# Patient Record
Sex: MALE | Race: WHITE | URBAN - METROPOLITAN AREA
[De-identification: names, ages, dates, MRNs, and addresses within clinical notes are randomized per-mention and may not be internally consistent; named-entity substitution may affect disease eponyms.]

---

## 2017-02-23 ENCOUNTER — OUTPATIENT (OUTPATIENT)
Dept: OUTPATIENT SERVICES | Facility: HOSPITAL | Age: 62
LOS: 1 days | Discharge: HOME | End: 2017-02-23

## 2017-06-27 DIAGNOSIS — J02.9 ACUTE PHARYNGITIS, UNSPECIFIED: ICD-10-CM

## 2017-12-24 ENCOUNTER — EMERGENCY (EMERGENCY)
Facility: HOSPITAL | Age: 62
LOS: 0 days | Discharge: HOME | End: 2017-12-24
Admitting: INTERNAL MEDICINE

## 2017-12-24 DIAGNOSIS — Z98.890 OTHER SPECIFIED POSTPROCEDURAL STATES: ICD-10-CM

## 2017-12-24 DIAGNOSIS — M86.10 OTHER ACUTE OSTEOMYELITIS, UNSPECIFIED SITE: ICD-10-CM

## 2017-12-24 DIAGNOSIS — Z79.899 OTHER LONG TERM (CURRENT) DRUG THERAPY: ICD-10-CM

## 2017-12-24 DIAGNOSIS — R50.9 FEVER, UNSPECIFIED: ICD-10-CM

## 2017-12-24 DIAGNOSIS — Z87.891 PERSONAL HISTORY OF NICOTINE DEPENDENCE: ICD-10-CM

## 2017-12-24 DIAGNOSIS — I25.10 ATHEROSCLEROTIC HEART DISEASE OF NATIVE CORONARY ARTERY WITHOUT ANGINA PECTORIS: ICD-10-CM

## 2017-12-24 DIAGNOSIS — R42 DIZZINESS AND GIDDINESS: ICD-10-CM

## 2017-12-24 DIAGNOSIS — I10 ESSENTIAL (PRIMARY) HYPERTENSION: ICD-10-CM

## 2017-12-24 DIAGNOSIS — J18.9 PNEUMONIA, UNSPECIFIED ORGANISM: ICD-10-CM

## 2017-12-24 DIAGNOSIS — F41.0 PANIC DISORDER [EPISODIC PAROXYSMAL ANXIETY]: ICD-10-CM

## 2017-12-24 DIAGNOSIS — Z79.02 LONG TERM (CURRENT) USE OF ANTITHROMBOTICS/ANTIPLATELETS: ICD-10-CM

## 2017-12-24 DIAGNOSIS — F32.9 MAJOR DEPRESSIVE DISORDER, SINGLE EPISODE, UNSPECIFIED: ICD-10-CM

## 2017-12-24 DIAGNOSIS — I25.2 OLD MYOCARDIAL INFARCTION: ICD-10-CM

## 2017-12-24 DIAGNOSIS — Z95.1 PRESENCE OF AORTOCORONARY BYPASS GRAFT: ICD-10-CM

## 2018-01-10 ENCOUNTER — TRANSCRIPTION ENCOUNTER (OUTPATIENT)
Age: 63
End: 2018-01-10

## 2018-04-23 ENCOUNTER — TRANSCRIPTION ENCOUNTER (OUTPATIENT)
Age: 63
End: 2018-04-23

## 2018-12-30 ENCOUNTER — TRANSCRIPTION ENCOUNTER (OUTPATIENT)
Age: 63
End: 2018-12-30

## 2019-02-01 ENCOUNTER — TRANSCRIPTION ENCOUNTER (OUTPATIENT)
Age: 64
End: 2019-02-01

## 2019-04-05 PROBLEM — Z00.00 ENCOUNTER FOR PREVENTIVE HEALTH EXAMINATION: Status: ACTIVE | Noted: 2019-04-05

## 2019-04-09 ENCOUNTER — APPOINTMENT (OUTPATIENT)
Dept: NEUROSURGERY | Facility: CLINIC | Age: 64
End: 2019-04-09
Payer: COMMERCIAL

## 2019-04-09 VITALS — BODY MASS INDEX: 29.25 KG/M2 | WEIGHT: 193 LBS | HEIGHT: 68 IN

## 2019-04-09 DIAGNOSIS — E78.5 HYPERLIPIDEMIA, UNSPECIFIED: ICD-10-CM

## 2019-04-09 DIAGNOSIS — I25.10 ATHEROSCLEROTIC HEART DISEASE OF NATIVE CORONARY ARTERY W/OUT ANGINA PECTORIS: ICD-10-CM

## 2019-04-09 DIAGNOSIS — Z87.891 PERSONAL HISTORY OF NICOTINE DEPENDENCE: ICD-10-CM

## 2019-04-09 DIAGNOSIS — F41.9 ANXIETY DISORDER, UNSPECIFIED: ICD-10-CM

## 2019-04-09 PROCEDURE — 99204 OFFICE O/P NEW MOD 45 MIN: CPT

## 2019-04-09 RX ORDER — DULOXETINE HYDROCHLORIDE 30 MG/1
CAPSULE, DELAYED RELEASE ORAL
Refills: 0 | Status: ACTIVE | COMMUNITY

## 2019-04-09 RX ORDER — CLOPIDOGREL 75 MG/1
TABLET, FILM COATED ORAL
Refills: 0 | Status: ACTIVE | COMMUNITY

## 2019-04-09 RX ORDER — SIMVASTATIN 40 MG/1
TABLET, FILM COATED ORAL
Refills: 0 | Status: ACTIVE | COMMUNITY

## 2019-04-09 RX ORDER — NIACIN 1000 MG/1
TABLET, FILM COATED, EXTENDED RELEASE ORAL
Refills: 0 | Status: ACTIVE | COMMUNITY

## 2019-04-09 NOTE — PHYSICAL EXAM
[General Appearance - Alert] : alert [General Appearance - In No Acute Distress] : in no acute distress [General Appearance - Well Nourished] : well nourished [General Appearance - Well-Appearing] : healthy appearing [General Appearance - Well Developed] : well developed [Intact] : all reflexes within normal limits bilaterally [FreeTextEntry6] : He has positive provocative maneuvers indicating an SI joint dysfunction.  [Straight-Leg Raise Test - Left] : straight leg raise of the left leg was negative [Straight-Leg Raise Test - Right] : straight leg raise  of the right leg was negative

## 2019-04-09 NOTE — REASON FOR VISIT
[Referred By: _________] : Patient was referred by FE [Consultation] : a consultation visit [Spouse] : spouse
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
## 2019-04-09 NOTE — ASSESSMENT
[FreeTextEntry1] : Mr. Melo is aware of his findings. We have had a thorough discussion today. I believe he is experiencing a component of SI joint dysfunction. I would like him to start PT for these issues. He does not wish to consult with pain management for SI joint blocks. He is not considering any surgical intervention due to his cardiac history. I will see him back in 8 weeks for reassessment.

## 2019-06-11 ENCOUNTER — APPOINTMENT (OUTPATIENT)
Dept: NEUROSURGERY | Facility: CLINIC | Age: 64
End: 2019-06-11
Payer: COMMERCIAL

## 2019-06-11 VITALS — HEIGHT: 68 IN | WEIGHT: 195 LBS | BODY MASS INDEX: 29.55 KG/M2

## 2019-06-11 DIAGNOSIS — R29.898 OTHER SYMPTOMS AND SIGNS INVOLVING THE MUSCULOSKELETAL SYSTEM: ICD-10-CM

## 2019-06-11 PROCEDURE — 99214 OFFICE O/P EST MOD 30 MIN: CPT

## 2019-06-11 NOTE — REASON FOR VISIT
[Follow-Up: _____] : a [unfilled] follow-up visit [Spouse] : spouse [FreeTextEntry1] : He continues to have lower back pain especially around the SI joint area. He did undergo an injection by Dr. Huitron which provided him with pain relief for a couple weeks. He denies radicular symptoms. His pain is isolated around the SI joints. He has recently developed bilateral hand pain worse on the right side then the left. He denies radiculopathy down the arms.
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
## 2019-06-11 NOTE — PHYSICAL EXAM
[FreeTextEntry1] : He has weakness in both hands in the intrinsic muscles with associated sensory changes especially in a C7 distribution. Tinel's is negative at the wrists and elbows. Gait is steady.

## 2019-06-11 NOTE — ASSESSMENT
[FreeTextEntry1] : I would like him to undergo an EMG of the upper extremities to rule out peripheral neuropathy. In addition I have recommended RFA with Dr. Huitron to his SI Joint. Mr. Melo is not a good surgical candidate for surgical intervention due to his extensive cardiac history.

## 2019-06-23 ENCOUNTER — EMERGENCY (EMERGENCY)
Facility: HOSPITAL | Age: 64
LOS: 0 days | Discharge: HOME | End: 2019-06-23
Admitting: EMERGENCY MEDICINE
Payer: MEDICARE

## 2019-06-23 VITALS
HEART RATE: 71 BPM | TEMPERATURE: 98 F | RESPIRATION RATE: 20 BRPM | DIASTOLIC BLOOD PRESSURE: 58 MMHG | OXYGEN SATURATION: 95 % | SYSTOLIC BLOOD PRESSURE: 120 MMHG

## 2019-06-23 DIAGNOSIS — Z87.891 PERSONAL HISTORY OF NICOTINE DEPENDENCE: ICD-10-CM

## 2019-06-23 DIAGNOSIS — Z88.2 ALLERGY STATUS TO SULFONAMIDES: ICD-10-CM

## 2019-06-23 DIAGNOSIS — M54.9 DORSALGIA, UNSPECIFIED: ICD-10-CM

## 2019-06-23 DIAGNOSIS — M54.41 LUMBAGO WITH SCIATICA, RIGHT SIDE: ICD-10-CM

## 2019-06-23 DIAGNOSIS — G89.29 OTHER CHRONIC PAIN: ICD-10-CM

## 2019-06-23 DIAGNOSIS — Z88.8 ALLERGY STATUS TO OTHER DRUGS, MEDICAMENTS AND BIOLOGICAL SUBSTANCES STATUS: ICD-10-CM

## 2019-06-23 PROCEDURE — 99283 EMERGENCY DEPT VISIT LOW MDM: CPT

## 2019-06-23 RX ORDER — DEXAMETHASONE 0.5 MG/5ML
10 ELIXIR ORAL ONCE
Refills: 0 | Status: COMPLETED | OUTPATIENT
Start: 2019-06-23 | End: 2019-06-23

## 2019-06-23 RX ORDER — KETOROLAC TROMETHAMINE 30 MG/ML
15 SYRINGE (ML) INJECTION ONCE
Refills: 0 | Status: DISCONTINUED | OUTPATIENT
Start: 2019-06-23 | End: 2019-06-23

## 2019-06-23 RX ADMIN — Medication 15 MILLIGRAM(S): at 18:43

## 2019-06-23 RX ADMIN — Medication 10 MILLIGRAM(S): at 18:44
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
## 2019-06-23 NOTE — ED PROVIDER NOTE - PHYSICAL EXAMINATION
Gen: NAD  Head: NCAT  HEENT: PERRL, oral mucosa moist, normal conjunctiva, oropharynx clear without exudate or erythema  Lung: CTAB, no respiratory distress, no wheezing, rales, rhonchi  CV: normal s1/s2, rrr, Normal perfusion, pulses 2+ throughout  MSK: No edema, no visible deformities, full range of motion in all 4 extremities. RLE: pedal pulse present. AROM. tenderness w/ flexion of hip.   Neuro: AOx3  Skin: No rash   Psych: normal affect

## 2019-06-23 NOTE — ED PROVIDER NOTE - NS ED ROS FT
Constitutional: (-) fever  Eyes/ENT: (-) blurry vision, (-) epistaxis  Cardiovascular: (-) chest pain, (-) syncope  Respiratory: (-) cough, (-) shortness of breath  Gastrointestinal: (-) vomiting, (-) diarrhea  Genitourinary: (-) dysuria, (-) hesitancy, (-) frequency   Musculoskeletal: (-) neck pain, (+) back pain, (-) joint pain  Integumentary: (-) rash, (-) edema  Neurological: (-) headache, (-) altered mental status  Allergic/Immunologic: (-) pruritus

## 2019-06-23 NOTE — ED ADULT NURSE NOTE - CAS ELECT INFOMATION PROVIDED
pt instructed to follow up with pmd in 1-2 days or return to ed for new or worsening symptoms/DC instructions

## 2019-06-23 NOTE — ED PROVIDER NOTE - CLINICAL SUMMARY MEDICAL DECISION MAKING FREE TEXT BOX
62 yo male with a hx of chronic back pain presents with radiating pain to the R lower back. will treat with decadron and Toradol. have f/u with his pain management physician (Dr. Huitron). 64 yo male with a hx of chronic back pain presents with radiating pain to the R lower back. will treat with decadron and Toradol. pt pain improved with treatment and is able to ambulate with no complications. have f/u with his pain management physician (Dr. Huitron).

## 2019-06-23 NOTE — ED PROVIDER NOTE - NSFOLLOWUPINSTRUCTIONS_ED_ALL_ED_FT
Please follow up with your primary care physician and pain management physician within 24-72 hours and return immediately if symptoms worsen.    Back Pain    WHAT YOU NEED TO KNOW:    Back pain is common. It can be caused by many conditions, such as arthritis or the breakdown of spinal discs. Your risk for back pain is increased by injuries, lack of activity, or repeated bending and twisting. You may feel sore or stiff on one or both sides of your back. The pain may spread to your buttocks or thighs.    DISCHARGE INSTRUCTIONS:    Return to the emergency department if:     You have pain, numbness, or weakness in one or both legs.      Your pain becomes so severe that you cannot walk.      You cannot control your urine or bowel movements.      You have severe back pain with chest pain.      You have severe back pain, nausea, and vomiting.      You have severe back pain that spreads to your side or genital area.    Contact your healthcare provider if:     You have back pain that does not get better with rest and pain medicine.      You have a fever.      You have pain that worsens when you are on your back or when you rest.      You have pain that worsens when you cough or sneeze.      You lose weight without trying.      You have questions or concerns about your condition or care.    Medicines:     NSAIDs help decrease swelling and pain. This medicine is available with or without a doctor's order. NSAIDs can cause stomach bleeding or kidney problems in certain people. If you take blood thinner medicine, always ask your healthcare provider if NSAIDs are safe for you. Always read the medicine label and follow directions.      Acetaminophen decreases pain and fever. It is available without a doctor's order. Ask how much to take and how often to take it. Follow directions. Read the labels of all other medicines you are using to see if they also contain acetaminophen, or ask your doctor or pharmacist. Acetaminophen can cause liver damage if not taken correctly. Do not use more than 4 grams (4,000 milligrams) total of acetaminophen in one day.       Muscle relaxers help decrease muscle spasms and back pain.      Prescription pain medicine may be given. Ask your healthcare provider how to take this medicine safely. Some prescription pain medicines contain acetaminophen. Do not take other medicines that contain acetaminophen without talking to your healthcare provider. Too much acetaminophen may cause liver damage. Prescription pain medicine may cause constipation. Ask your healthcare provider how to prevent or treat constipation.       Take your medicine as directed. Contact your healthcare provider if you think your medicine is not helping or if you have side effects. Tell him or her if you are allergic to any medicine. Keep a list of the medicines, vitamins, and herbs you take. Include the amounts, and when and why you take them. Bring the list or the pill bottles to follow-up visits. Carry your medicine list with you in case of an emergency.    How to manage your back pain:     Apply ice on your back for 15 to 20 minutes every hour or as directed. Use an ice pack, or put crushed ice in a plastic bag. Cover it with a towel before you apply it to your skin. Ice helps prevent tissue damage and decreases pain.      Apply heat on your back for 20 to 30 minutes every 2 hours for as many days as directed. Heat helps decrease pain and muscle spasms.      Stay active as much as you can without causing more pain. Bed rest could make your back pain worse. Avoid heavy lifting until your pain is gone.      Go to physical therapy as directed. A physical therapist can teach you exercises to help improve movement and strength, and to decrease pain.    Follow up with your healthcare provider in 2 weeks, or as directed: Write down your questions so you remember to ask them during your visits.       © Copyright [employer] 2019 All illustrations and images included in CareNotes are the copyrighted property of [email or web address] or [employer].

## 2019-06-26 ENCOUNTER — EMERGENCY (EMERGENCY)
Facility: HOSPITAL | Age: 64
LOS: 0 days | Discharge: HOME | End: 2019-06-26
Admitting: EMERGENCY MEDICINE
Payer: MEDICARE

## 2019-06-26 VITALS
TEMPERATURE: 97 F | RESPIRATION RATE: 18 BRPM | SYSTOLIC BLOOD PRESSURE: 164 MMHG | HEART RATE: 58 BPM | DIASTOLIC BLOOD PRESSURE: 73 MMHG | OXYGEN SATURATION: 98 %

## 2019-06-26 DIAGNOSIS — Z88.2 ALLERGY STATUS TO SULFONAMIDES: ICD-10-CM

## 2019-06-26 DIAGNOSIS — M54.41 LUMBAGO WITH SCIATICA, RIGHT SIDE: ICD-10-CM

## 2019-06-26 DIAGNOSIS — Z87.891 PERSONAL HISTORY OF NICOTINE DEPENDENCE: ICD-10-CM

## 2019-06-26 DIAGNOSIS — M54.9 DORSALGIA, UNSPECIFIED: ICD-10-CM

## 2019-06-26 DIAGNOSIS — Z88.8 ALLERGY STATUS TO OTHER DRUGS, MEDICAMENTS AND BIOLOGICAL SUBSTANCES STATUS: ICD-10-CM

## 2019-06-26 PROCEDURE — 99283 EMERGENCY DEPT VISIT LOW MDM: CPT

## 2019-06-26 RX ORDER — METHOCARBAMOL 500 MG/1
2 TABLET, FILM COATED ORAL
Qty: 18 | Refills: 0
Start: 2019-06-26 | End: 2019-06-28

## 2019-06-26 RX ORDER — METHOCARBAMOL 500 MG/1
1500 TABLET, FILM COATED ORAL ONCE
Refills: 0 | Status: COMPLETED | OUTPATIENT
Start: 2019-06-26 | End: 2019-06-26

## 2019-06-26 RX ORDER — DEXAMETHASONE 0.5 MG/5ML
10 ELIXIR ORAL ONCE
Refills: 0 | Status: COMPLETED | OUTPATIENT
Start: 2019-06-26 | End: 2019-06-26

## 2019-06-26 RX ORDER — KETOROLAC TROMETHAMINE 30 MG/ML
30 SYRINGE (ML) INJECTION ONCE
Refills: 0 | Status: DISCONTINUED | OUTPATIENT
Start: 2019-06-26 | End: 2019-06-26

## 2019-06-26 RX ADMIN — Medication 10 MILLIGRAM(S): at 13:45

## 2019-06-26 RX ADMIN — METHOCARBAMOL 1500 MILLIGRAM(S): 500 TABLET, FILM COATED ORAL at 13:44

## 2019-06-26 RX ADMIN — Medication 30 MILLIGRAM(S): at 13:47
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
## 2019-06-26 NOTE — ED PROVIDER NOTE - CARE PROVIDER_API CALL
Walt Huitron (MD)  Anesthesiology; Pain Medicine  [address] [place] [address]  Phone: [phone]  Fax: [phone]  Follow Up Time: 1-3 Days

## 2019-06-26 NOTE — ED PROVIDER NOTE - NS ED ROS FT
Review of Systems:  	•	CONSTITUTIONAL - no fever, no diaphoresis, no chills  	•	SKIN - no rash  	•	HEMATOLOGIC - no bleeding, no bruising  	•	EYES - no eye pain, no blurry vision  	•	ENT - no congestion  	•	RESPIRATORY - no shortness of breath, no cough  	•	CARDIAC - no chest pain, no palpitations  	•	GI - no abd pain, no nausea, no vomiting, no diarrhea, no constipation  	•	GENITO-URINARY - no dysuria; no hematuria, no increased urinary frequency  	•	MUSCULOSKELETAL - +back pain, no joint paint, no swelling, no redness  	•	NEUROLOGIC - no weakness, no headache, no paresthesias, no LOC  	All other ROS are negative except as documented in HPI.

## 2019-06-26 NOTE — ED PROVIDER NOTE - PHYSICAL EXAMINATION
VITAL SIGNS: I have reviewed nursing notes and confirm.  CONSTITUTIONAL: Well-developed; well-nourished; in no acute distress.  SKIN: Skin exam is warm and dry, no acute rash.  HEAD: Normocephalic; atraumatic.  EYES: PERRL, EOM intact; conjunctiva and sclera clear.  ENT: No nasal discharge; airway clear.   NECK: Supple; non tender.  CARD: S1, S2 normal; no murmurs, gallops, or rubs. Regular rate and rhythm.  RESP: Clear to auscultation bilaterally. No wheezes, rales or rhonchi.  ABD: Normal bowel sounds; soft; non-distended; non-tender.   EXT: Normal ROM. No edema. +Right sciatic notch tenderness. +Straight leg test.   NEURO: Alert, oriented. Grossly unremarkable. No focal deficits. No saddle anesthesia.   PSYCH: Cooperative, appropriate.

## 2019-07-02 ENCOUNTER — APPOINTMENT (OUTPATIENT)
Dept: UROLOGY | Facility: CLINIC | Age: 64
End: 2019-07-02
Payer: MEDICARE

## 2019-07-02 VITALS
DIASTOLIC BLOOD PRESSURE: 75 MMHG | HEART RATE: 60 BPM | BODY MASS INDEX: 29.55 KG/M2 | HEIGHT: 68 IN | WEIGHT: 195 LBS | SYSTOLIC BLOOD PRESSURE: 142 MMHG

## 2019-07-02 DIAGNOSIS — N40.0 BENIGN PROSTATIC HYPERPLASIA WITHOUT LOWER URINARY TRACT SYMPMS: ICD-10-CM

## 2019-07-02 PROCEDURE — 99204 OFFICE O/P NEW MOD 45 MIN: CPT

## 2019-08-06 ENCOUNTER — APPOINTMENT (OUTPATIENT)
Dept: NEUROSURGERY | Facility: CLINIC | Age: 64
End: 2019-08-06
Payer: COMMERCIAL

## 2019-08-06 VITALS — HEIGHT: 68 IN | WEIGHT: 195 LBS | BODY MASS INDEX: 29.55 KG/M2

## 2019-08-06 DIAGNOSIS — G56.03 CARPAL TUNNEL SYNDROM,BILATERAL UPPER LIMBS: ICD-10-CM

## 2019-08-06 DIAGNOSIS — M47.812 SPONDYLOSIS W/OUT MYELOPATHY OR RADICULOPATHY, CERVICAL REGION: ICD-10-CM

## 2019-08-06 PROCEDURE — 99214 OFFICE O/P EST MOD 30 MIN: CPT

## 2019-08-06 NOTE — ASSESSMENT
[FreeTextEntry1] : Mr. Melo continues to have right low back pain which occasionally travels to the right lateral thigh and calf. In addition his bilateral hand pain and tightness has worsened since I saw him last, along with some weakness. \par \par On exam, he has very positive Brendan test on the right and compression test. In addition he has positive Norma sign, His gait is somewhat slow and spastic. He has hyper-reflexia and positive Garcia signs as well. He has positive Tinel sign at both wrists, worse on the right. He also has some hand swelling, worse on the right. There is also weakness of both hand muscles, worse on the right. His right arm muscle movement appears to be slower and tighter although he does not complain of any arm pain or neck pain. \par \par EMG in June suggested mild bilateral carpal tunnel syndromes. MRI of the cervical spine early part of this year did not suggest spinal cord signal changes or significant compression at all although there is spondylotic changes. \par \par I would like him to start taking Vitamin B6 daily and wear the wrists braces to see if it would help his CTS. He will also ask his PMD to see if  he should see a rheumatologist regarding his hand swelling and joint pain.I would also like him to undergo a CT-guided right SI joint injection for confirmation of right sacroiliitis. Past injection and tests has been equivocal. Once the dx is confirmed, we will plan treatments. In additionhe will undergo MRI of brain to r/o intracranial process that may explains his spasticity and positive myelopathic signs.\par \par I will see him again for followup.

## 2019-08-13 ENCOUNTER — FORM ENCOUNTER (OUTPATIENT)
Age: 64
End: 2019-08-13

## 2019-08-14 ENCOUNTER — OUTPATIENT (OUTPATIENT)
Dept: OUTPATIENT SERVICES | Facility: HOSPITAL | Age: 64
LOS: 1 days | Discharge: HOME | End: 2019-08-14
Payer: COMMERCIAL

## 2019-08-14 ENCOUNTER — FORM ENCOUNTER (OUTPATIENT)
Age: 64
End: 2019-08-14

## 2019-08-14 PROCEDURE — 77012 CT SCAN FOR NEEDLE BIOPSY: CPT | Mod: 26

## 2019-08-14 PROCEDURE — 27096 INJECT SACROILIAC JOINT: CPT | Mod: RT

## 2019-08-14 PROCEDURE — 72192 CT PELVIS W/O DYE: CPT | Mod: 26

## 2019-08-14 NOTE — PROGRESS NOTE ADULT - SUBJECTIVE AND OBJECTIVE BOX
PREOPERATIVE DAY OF PROCEDURE EVALUATION:     I have personally seen and examined this patient. I agree with the history and physical which I have reviewed and noted any changes below:     Plan is for right sided CT guided sacroiliac joint injection today 8/14    Procedure/ risks/ benefits/ goals/ alternatives were explained. All questions answered. Informed content obtained from patient. Consent placed in chart.

## 2019-08-14 NOTE — PROGRESS NOTE ADULT - SUBJECTIVE AND OBJECTIVE BOX
Interventional Radiology Brief- Operative Note    Procedure: right sided CT guided sacroiliac joint injection    Pre-Op Diagnosis: sacroiliitis     Post-Op Diagnosis: same     Attending: Sergio  Performing  Provider: DAYANA Chakraborty    Anesthesia (type):  [ ] General Anesthesia  [ ] Sedation  [ ] Spinal Anesthesia  [ x ] Local/Regional    Contrast: none    Estimated Blood Loss: <5mL    Condition:   [ ] Critical  [ ] Serious  [ ] Fair   [ x ] Good    Findings/Follow up Plan of Care: right sacroiliac joint injection - see full report in PACS    Specimens Removed: none    Implants: none     Complications: none    Condition/Disposition: d/c home       Please call Interventional Radiology [phone] with any questions, concerns, or issues.

## 2019-08-15 ENCOUNTER — OUTPATIENT (OUTPATIENT)
Dept: OUTPATIENT SERVICES | Facility: HOSPITAL | Age: 64
LOS: 1 days | Discharge: HOME | End: 2019-08-15
Payer: MEDICARE

## 2019-08-15 DIAGNOSIS — R25.2 CRAMP AND SPASM: ICD-10-CM

## 2019-08-15 PROCEDURE — 70551 MRI BRAIN STEM W/O DYE: CPT | Mod: 26

## 2019-08-19 DIAGNOSIS — M46.1 SACROILIITIS, NOT ELSEWHERE CLASSIFIED: ICD-10-CM

## 2019-08-29 ENCOUNTER — APPOINTMENT (OUTPATIENT)
Dept: NEUROSURGERY | Facility: CLINIC | Age: 64
End: 2019-08-29
Payer: COMMERCIAL

## 2019-08-29 VITALS — HEIGHT: 68 IN | BODY MASS INDEX: 29.55 KG/M2 | WEIGHT: 195 LBS

## 2019-08-29 DIAGNOSIS — M46.1 SACROILIITIS, NOT ELSEWHERE CLASSIFIED: ICD-10-CM

## 2019-08-29 DIAGNOSIS — M47.816 SPONDYLOSIS W/OUT MYELOPATHY OR RADICULOPATHY, LUMBAR REGION: ICD-10-CM

## 2019-08-29 PROCEDURE — 99214 OFFICE O/P EST MOD 30 MIN: CPT

## 2019-08-29 NOTE — ASSESSMENT
[FreeTextEntry1] : Upon conclusion of today's visit Mr. Melo feel's his condition is manageable. He will continue with conservative treatments. I will see him in the office on an as needed basis.

## 2019-08-29 NOTE — HISTORY OF PRESENT ILLNESS
[FreeTextEntry1] : Since is last visit he had a CT guided SI joint injection on the right. Unfortunately, this did not provide him with pain relief. He also had an MRI of the brain which was normal.

## 2019-10-11 ENCOUNTER — INPATIENT (INPATIENT)
Facility: HOSPITAL | Age: 64
LOS: 0 days | Discharge: HOME | End: 2019-10-12
Attending: INTERNAL MEDICINE | Admitting: INTERNAL MEDICINE
Payer: MEDICARE

## 2019-10-11 VITALS
OXYGEN SATURATION: 98 % | HEART RATE: 60 BPM | HEIGHT: 68 IN | TEMPERATURE: 99 F | DIASTOLIC BLOOD PRESSURE: 79 MMHG | WEIGHT: 197.98 LBS | SYSTOLIC BLOOD PRESSURE: 174 MMHG | RESPIRATION RATE: 18 BRPM

## 2019-10-11 DIAGNOSIS — Z95.1 PRESENCE OF AORTOCORONARY BYPASS GRAFT: Chronic | ICD-10-CM

## 2019-10-11 DIAGNOSIS — Z90.49 ACQUIRED ABSENCE OF OTHER SPECIFIED PARTS OF DIGESTIVE TRACT: Chronic | ICD-10-CM

## 2019-10-11 LAB
ALBUMIN SERPL ELPH-MCNC: 4.5 G/DL — SIGNIFICANT CHANGE UP (ref 3.5–5.2)
ALP SERPL-CCNC: 113 U/L — SIGNIFICANT CHANGE UP (ref 30–115)
ALT FLD-CCNC: 14 U/L — SIGNIFICANT CHANGE UP (ref 0–41)
ANION GAP SERPL CALC-SCNC: 12 MMOL/L — SIGNIFICANT CHANGE UP (ref 7–14)
AST SERPL-CCNC: 19 U/L — SIGNIFICANT CHANGE UP (ref 0–41)
BILIRUB SERPL-MCNC: 0.3 MG/DL — SIGNIFICANT CHANGE UP (ref 0.2–1.2)
BUN SERPL-MCNC: 15 MG/DL — SIGNIFICANT CHANGE UP (ref 10–20)
CALCIUM SERPL-MCNC: 9.4 MG/DL — SIGNIFICANT CHANGE UP (ref 8.5–10.1)
CHLORIDE SERPL-SCNC: 105 MMOL/L — SIGNIFICANT CHANGE UP (ref 98–110)
CK MB CFR SERPL CALC: <1 NG/ML — SIGNIFICANT CHANGE UP (ref 0.6–6.3)
CK SERPL-CCNC: 46 U/L — SIGNIFICANT CHANGE UP (ref 0–225)
CO2 SERPL-SCNC: 25 MMOL/L — SIGNIFICANT CHANGE UP (ref 17–32)
CREAT SERPL-MCNC: 1 MG/DL — SIGNIFICANT CHANGE UP (ref 0.7–1.5)
GLUCOSE SERPL-MCNC: 105 MG/DL — HIGH (ref 70–99)
HCT VFR BLD CALC: 38.5 % — LOW (ref 42–52)
HGB BLD-MCNC: 12.7 G/DL — LOW (ref 14–18)
MCHC RBC-ENTMCNC: 29 PG — SIGNIFICANT CHANGE UP (ref 27–31)
MCHC RBC-ENTMCNC: 33 G/DL — SIGNIFICANT CHANGE UP (ref 32–37)
MCV RBC AUTO: 87.9 FL — SIGNIFICANT CHANGE UP (ref 80–94)
NRBC # BLD: 0 /100 WBCS — SIGNIFICANT CHANGE UP (ref 0–0)
PLATELET # BLD AUTO: 187 K/UL — SIGNIFICANT CHANGE UP (ref 130–400)
POTASSIUM SERPL-MCNC: 4.3 MMOL/L — SIGNIFICANT CHANGE UP (ref 3.5–5)
POTASSIUM SERPL-SCNC: 4.3 MMOL/L — SIGNIFICANT CHANGE UP (ref 3.5–5)
PROT SERPL-MCNC: 7.4 G/DL — SIGNIFICANT CHANGE UP (ref 6–8)
RBC # BLD: 4.38 M/UL — LOW (ref 4.7–6.1)
RBC # FLD: 13.1 % — SIGNIFICANT CHANGE UP (ref 11.5–14.5)
SODIUM SERPL-SCNC: 142 MMOL/L — SIGNIFICANT CHANGE UP (ref 135–146)
TROPONIN T SERPL-MCNC: <0.01 NG/ML — SIGNIFICANT CHANGE UP
TROPONIN T SERPL-MCNC: <0.01 NG/ML — SIGNIFICANT CHANGE UP
WBC # BLD: 5.08 K/UL — SIGNIFICANT CHANGE UP (ref 4.8–10.8)
WBC # FLD AUTO: 5.08 K/UL — SIGNIFICANT CHANGE UP (ref 4.8–10.8)

## 2019-10-11 PROCEDURE — 71046 X-RAY EXAM CHEST 2 VIEWS: CPT | Mod: 26

## 2019-10-11 PROCEDURE — 99285 EMERGENCY DEPT VISIT HI MDM: CPT

## 2019-10-11 PROCEDURE — 93010 ELECTROCARDIOGRAM REPORT: CPT

## 2019-10-11 RX ORDER — RANOLAZINE 500 MG/1
1 TABLET, FILM COATED, EXTENDED RELEASE ORAL
Qty: 0 | Refills: 0 | DISCHARGE

## 2019-10-11 RX ORDER — SIMVASTATIN 20 MG/1
1 TABLET, FILM COATED ORAL
Qty: 0 | Refills: 0 | DISCHARGE

## 2019-10-11 RX ORDER — SIMVASTATIN 20 MG/1
20 TABLET, FILM COATED ORAL AT BEDTIME
Refills: 0 | Status: DISCONTINUED | OUTPATIENT
Start: 2019-10-11 | End: 2019-10-12

## 2019-10-11 RX ORDER — RANOLAZINE 500 MG/1
500 TABLET, FILM COATED, EXTENDED RELEASE ORAL
Refills: 0 | Status: DISCONTINUED | OUTPATIENT
Start: 2019-10-11 | End: 2019-10-12

## 2019-10-11 RX ORDER — HYDROCHLOROTHIAZIDE 25 MG
12.5 TABLET ORAL DAILY
Refills: 0 | Status: DISCONTINUED | OUTPATIENT
Start: 2019-10-11 | End: 2019-10-12

## 2019-10-11 RX ORDER — DULOXETINE HYDROCHLORIDE 30 MG/1
60 CAPSULE, DELAYED RELEASE ORAL AT BEDTIME
Refills: 0 | Status: DISCONTINUED | OUTPATIENT
Start: 2019-10-11 | End: 2019-10-12

## 2019-10-11 RX ORDER — CLOPIDOGREL BISULFATE 75 MG/1
75 TABLET, FILM COATED ORAL DAILY
Refills: 0 | Status: DISCONTINUED | OUTPATIENT
Start: 2019-10-11 | End: 2019-10-12

## 2019-10-11 RX ORDER — CLOPIDOGREL BISULFATE 75 MG/1
1 TABLET, FILM COATED ORAL
Qty: 0 | Refills: 0 | DISCHARGE

## 2019-10-11 RX ORDER — NIACIN 50 MG
500 TABLET ORAL AT BEDTIME
Refills: 0 | Status: DISCONTINUED | OUTPATIENT
Start: 2019-10-11 | End: 2019-10-12

## 2019-10-11 RX ORDER — DIAZEPAM 5 MG
1 TABLET ORAL
Qty: 0 | Refills: 0 | DISCHARGE

## 2019-10-11 RX ORDER — ENOXAPARIN SODIUM 100 MG/ML
40 INJECTION SUBCUTANEOUS DAILY
Refills: 0 | Status: DISCONTINUED | OUTPATIENT
Start: 2019-10-11 | End: 2019-10-12

## 2019-10-11 RX ORDER — NIACIN 50 MG
1 TABLET ORAL
Qty: 0 | Refills: 0 | DISCHARGE

## 2019-10-11 RX ORDER — DIAZEPAM 5 MG
10 TABLET ORAL DAILY
Refills: 0 | Status: DISCONTINUED | OUTPATIENT
Start: 2019-10-11 | End: 2019-10-12

## 2019-10-11 RX ADMIN — Medication 12.5 MILLIGRAM(S): at 17:45

## 2019-10-11 RX ADMIN — SIMVASTATIN 20 MILLIGRAM(S): 20 TABLET, FILM COATED ORAL at 22:03

## 2019-10-11 RX ADMIN — RANOLAZINE 500 MILLIGRAM(S): 500 TABLET, FILM COATED, EXTENDED RELEASE ORAL at 17:45

## 2019-10-11 NOTE — ED PROVIDER NOTE - ATTENDING CONTRIBUTION TO CARE
64 y/o M pmh CAD s/p CABG, HTN, DLD, p/w pressure like L sided cp waking him from sleep this 1-130am. "just like" prior MI. Resolved after 15-20 min and taking ASA. No SOB, fever, cough, new leg swelling. ROS PE above.  IMP: cp/ acs.  P: labs, ekg, cxr, reassess. Despite time interval from sx, consider admission given risk factors and HPI.

## 2019-10-11 NOTE — H&P ADULT - NSHPLABSRESULTS_GEN_ALL_CORE
===================== LABS =====================                        12.7   5.08  )-----------( 187      ( 11 Oct 2019 14:30 )             38.5     10-11    142  |  105  |  15  ----------------------------<  105<H>  4.3   |  25  |  1.0    Ca    9.4      11 Oct 2019 14:30    TPro  7.4  /  Alb  4.5  /  TBili  0.3  /  DBili  x   /  AST  19  /  ALT  14  /  AlkPhos  113  10-11          Troponin T, Serum: <0.01 ng/mL (10-11-19 @ 14:30)    CARDIAC MARKERS ( 11 Oct 2019 14:30 )  x     / <0.01 ng/mL / x     / x     / x          ================= MICROBIOLOGY ================    ================== IMAGING ==================    ================== OTHER SIGNIFICANT FINDINGS ==================    ================== PROCEDURES ==================    ================== EKG ================== ===================== LABS =====================                        12.7   5.08  )-----------( 187      ( 11 Oct 2019 14:30 )             38.5     10-11    142  |  105  |  15  ----------------------------<  105<H>  4.3   |  25  |  1.0    Ca    9.4      11 Oct 2019 14:30    TPro  7.4  /  Alb  4.5  /  TBili  0.3  /  DBili  x   /  AST  19  /  ALT  14  /  AlkPhos  113  10-11    Troponin T, Serum: <0.01 ng/mL (10-11-19 @ 14:30)    CARDIAC MARKERS ( 11 Oct 2019 14:30 )  x     / <0.01 ng/mL / x     / x     / x        ================== EKG ==================    < from: 12 Lead ECG (10.11.19 @ 13:13) >    Diagnosis Line Sinus bradycardia  Left ventricular hypertrophy with repolarization abnormality  Abnormal ECG

## 2019-10-11 NOTE — ED ADULT NURSE NOTE - CHPI ED NUR SYMPTOMS NEG
no vomiting/no nausea/no congestion/no back pain/no fever/no dizziness/no chills/no diaphoresis/no shortness of breath/no syncope

## 2019-10-11 NOTE — H&P ADULT - NSHPPHYSICALEXAM_GEN_ALL_CORE
=================== OBJECTIVE ===================    VITAL SIGNS: Last 24 Hours  T(C): 36.4 (11 Oct 2019 15:30), Max: 37.1 (11 Oct 2019 12:59)  T(F): 97.6 (11 Oct 2019 15:30), Max: 98.7 (11 Oct 2019 12:59)  HR: 50 (11 Oct 2019 15:30) (50 - 60)  BP: 135/85 (11 Oct 2019 15:30) (135/85 - 174/79)  BP(mean): --  RR: 18 (11 Oct 2019 15:30) (18 - 18)  SpO2: 100% (11 Oct 2019 15:30) (98% - 100%)    PHYSICAL EXAM:  GENERAL: NAD, well-developed  HEAD:  Atraumatic, Normocephalic  EYES: EOMI, PERRLA, conjunctiva and sclera clear  NECK: Supple, No JVD  CHEST/LUNG: Clear to auscultation bilaterally; No wheeze  HEART: Regular rate and rhythm; No murmurs, rubs, or gallops  ABDOMEN: Soft, Nontender, Nondistended; Bowel sounds present  EXTREMITIES:  2+ Peripheral Pulses, No clubbing, cyanosis, or edema  PSYCH: AAOx3  NEUROLOGY: non-focal  General:  Appearance is consistent with chronologic age.  No abnormal facies.   General: AA&Ox3.  Fund of knowledge is intact and normal.  Language with normal repetition, comprehension and naming.  Nondysarthric.    Cranial nerves: intact VA, VFF.  EOMI w/o nystagmus, skew or reported double vision.  PERRL.  No ptosis/weakness of eyelid closure.  Facial sensation is normal with normal bite.  No facial asymmetry.  Hearing grossly intact b/l.  Palate elevates midline.  Tongue midline.  Motor examination:   Normal tone, bulk and range of motion. ++right hand tremor     Formal Muscle Strength Testing: (MRC grade R/L) 5/5 UE; 5/5 LE.  No observable drift. ++ rigidity on circular wrist maneuver and on circular elbow maneuver. - Tinel test    Reflexes:   2+ b/l biceps and brachioradialis.   Sensory examination:   Intact to light touch and pinprick, pain, temperature and proprioception and vibration in all extremities.  Cerebellum:   FTN intact with normal YANETH in all limbs.  No dysmetria or dysdiadokinesia.    Gait: ambulates with walker, ++ asymmetric steps, decreased heel strike   SKIN: No rashes or lesions =================== OBJECTIVE ===================    VITAL SIGNS: Last 24 Hours  T(C): 36.4 (11 Oct 2019 15:30), Max: 37.1 (11 Oct 2019 12:59)  T(F): 97.6 (11 Oct 2019 15:30), Max: 98.7 (11 Oct 2019 12:59)  HR: 50 (11 Oct 2019 15:30) (50 - 60)  BP: 135/85 (11 Oct 2019 15:30) (135/85 - 174/79)  BP(mean): --  RR: 18 (11 Oct 2019 15:30) (18 - 18)  SpO2: 100% (11 Oct 2019 15:30) (98% - 100%)    PHYSICAL EXAM:  GENERAL: NAD, well-developed  HEAD:  Atraumatic, Normocephalic  EYES: EOMI, PERRLA, conjunctiva and sclera clear  NECK: Supple, No JVD  CHEST/LUNG: Clear to auscultation bilaterally; No wheeze  HEART: Regular rate and rhythm; Bradycardic. No murmurs, rubs, or gallops  ABDOMEN: Soft, Nontender, Nondistended; Bowel sounds present in all 4 quadrants. No bruits   EXTREMITIES:  2+ Peripheral Pulses, No clubbing, cyanosis, or edema  PSYCH: AAOx3  NEUROLOGY: non-focal  SKIN: No rashes or lesions. Sternostomy scar and linear scars of bilateral LEs

## 2019-10-11 NOTE — ED ADULT NURSE NOTE - OBJECTIVE STATEMENT
The patient is a 63y Male complaining of chest pain that started last night that started while sitting down watching TV. Patient states she took 4 ASA and symptoms resolved. Patient denies any chest pain at this time. Patient denies any shortness of breath, cough, recent fever, chills, abdominal pain, nausea or vomiting. Patient is a poor historian and is unknown of all names and dosages of medications.

## 2019-10-11 NOTE — H&P ADULT - ASSESSMENT
================= ASSESSMENT/PLAN ==================  Patient is a 63y old Male who presents with a chief complaint of Currently admitted to medicine with the primary diagnosis of Chest pain        #  PLAN    #  PLAN    #  PLAN    #  PLAN    #  PLAN    #  PLAN  #    GI PPX:   DVT PPX:     DIET:  ACTIVITY:  () Ad Patience  /  () Advance as Tolerated  /  () Bed Rest  /  () Fall Precaution  /  () Seizure precaution    ================= PRESENT TODAY ==================    1-Penn Catheter:  Indication:  2-Vascular Access:  []Peripheral | Indication:  []Midline | Indication:   []PICC Line | Indication:  []CVC | Indication:  []Arterial Line | Indication:  []Uldall Catheter | Indication:   3-IV Fluids: | Indication:  4-Ventilation: | Sat:     ================= DISPOSITION ==================    Patient to be discharged when condition(s) optimized.            Discharge to:              Home services:              Counseled on/Discussed cessation of:  () Risky behaviors  /  () Smoking cessation  /  () Diet  /  () Exercise  /  () Other    ================= CODE STATUS =================                  () FULL CODE     |     () DNR     |     () DNI    () Discussion with patient and/or family regarding goals of care ================= ASSESSMENT/PLAN ==================  Patient is a 63y old Male who presents with a chief complaint of Currently admitted to medicine with the primary diagnosis of Chest pain    # Atypical chest pain   Currently asymptomatic. However patient with risk factors and similar to prior pain   1st cardiac enzymes negative, EKG with no changes   PLAN  - Serial cardiac enzymes   - Serial EKGs   - Telemetry for 24 hours   - Continue Plavix and statin for now   - Avoid beta blockers given bradycardia   - Discuss with Dr. Horner switching to higher potency statin     # Dyslipidemia   # Hypertension   - Continue HCTZ, niacin and statin     # Anxiety   - Continue Cymbalta and diazepam       GI PPX: Not indicated   DVT PPX: Lovenox     DIET: DASH   ACTIVITY:  () Ad Patience  /  (X) Advance as Tolerated  /  () Bed Rest  /  () Fall Precaution  /  () Seizure precaution    ================= PRESENT TODAY ==================    1-Penn Catheter: No  Indication:  2-Vascular Access:  [X]Peripheral | Indication:  []Midline | Indication:   []PICC Line | Indication:  []CVC | Indication:  []Arterial Line | Indication:  []Uldall Catheter | Indication:   3-IV Fluids: Off | Indication:  4-Ventilation: Room air | Sat:     ================= DISPOSITION ==================    Patient to be discharged when condition(s) optimized.            Discharge to: Home when cleared             Home services:              Counseled on/Discussed cessation of:  () Risky behaviors  /  () Smoking cessation  /  () Diet  /  () Exercise  /  () Other    ================= CODE STATUS =================                  (X) FULL CODE     |     () DNR     |     () DNI    () Discussion with patient and/or family regarding goals of care

## 2019-10-11 NOTE — ED ADULT TRIAGE NOTE - CHIEF COMPLAINT QUOTE
"im having spreading pains in my chest since last night , I took 4 asa and the pain went away but it felt like my last heart attack. today the pain subsided"

## 2019-10-11 NOTE — ED ADULT NURSE REASSESSMENT NOTE - NS ED NURSE REASSESS COMMENT FT1
patient assessed, no complaints of chest pain at this time. Labs drawn results pending. Patient placed on cardiac monitor, sinus bradycardia noted, MD aware. Patient asymptomatic ,vital signs stable, will monitor.

## 2019-10-11 NOTE — ED PROVIDER NOTE - OBJECTIVE STATEMENT
63y M w/ PMH of CAD s/p quadruple bypass, HTN, and HLD presents with chest pain that started last night. States sudden onset sharp pain radiating across b/l chest that occurred at rest. No alleviating/worsening factors. Went away after taking 4 baby ASA. States symptoms are similar to when he had his last heart attack. Currently asymptomatic. Denies fever, chills, SOB, n/v, abd pain, or numbness/tingling.    cardiologist: Dr. Pena.

## 2019-10-11 NOTE — H&P ADULT - NSHPREVIEWOFSYSTEMS_GEN_ALL_CORE
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
## 2019-10-12 ENCOUNTER — TRANSCRIPTION ENCOUNTER (OUTPATIENT)
Age: 64
End: 2019-10-12

## 2019-10-12 VITALS
DIASTOLIC BLOOD PRESSURE: 70 MMHG | HEART RATE: 54 BPM | SYSTOLIC BLOOD PRESSURE: 140 MMHG | RESPIRATION RATE: 18 BRPM | TEMPERATURE: 96 F

## 2019-10-12 LAB
CHOLEST SERPL-MCNC: 124 MG/DL — SIGNIFICANT CHANGE UP (ref 100–200)
CK MB CFR SERPL CALC: <1 NG/ML — SIGNIFICANT CHANGE UP (ref 0.6–6.3)
CK SERPL-CCNC: 42 U/L — SIGNIFICANT CHANGE UP (ref 0–225)
ESTIMATED AVERAGE GLUCOSE: 128 MG/DL — HIGH (ref 68–114)
HBA1C BLD-MCNC: 6.1 % — HIGH (ref 4–5.6)
HDLC SERPL-MCNC: 40 MG/DL — SIGNIFICANT CHANGE UP
LIPID PNL WITH DIRECT LDL SERPL: 79 MG/DL — SIGNIFICANT CHANGE UP (ref 4–129)
TOTAL CHOLESTEROL/HDL RATIO MEASUREMENT: 3.1 RATIO — LOW (ref 4–5.5)
TRIGL SERPL-MCNC: 98 MG/DL — SIGNIFICANT CHANGE UP (ref 10–149)
TROPONIN T SERPL-MCNC: <0.01 NG/ML — SIGNIFICANT CHANGE UP
TSH SERPL-MCNC: 2.85 UIU/ML — SIGNIFICANT CHANGE UP (ref 0.27–4.2)

## 2019-10-12 RX ADMIN — Medication 12.5 MILLIGRAM(S): at 05:42

## 2019-10-12 RX ADMIN — CLOPIDOGREL BISULFATE 75 MILLIGRAM(S): 75 TABLET, FILM COATED ORAL at 12:44

## 2019-10-12 RX ADMIN — RANOLAZINE 500 MILLIGRAM(S): 500 TABLET, FILM COATED, EXTENDED RELEASE ORAL at 05:42

## 2019-10-12 NOTE — DISCHARGE NOTE NURSING/CASE MANAGEMENT/SOCIAL WORK - PATIENT PORTAL LINK FT
You can access the FollowMyHealth Patient Portal offered by [facility] by registering at the following website: [email or web address]. By joining [facility]’s FollowMyHealth portal, you will also be able to view your health information using other applications (apps) compatible with our system.

## 2019-10-12 NOTE — DISCHARGE NOTE PROVIDER - NSDCCPCAREPLAN_GEN_ALL_CORE_FT
PRINCIPAL DISCHARGE DIAGNOSIS  Diagnosis: Chest pain  Assessment and Plan of Treatment: RESOLVED  - EKG no ischemic changes. cardiac enzymes negative X3  - seen by cardiology, Dr Costa.   - take all medication as prescribed  - follow up with primary care doctor, Dr Gregg and cardiology, Dr Fidel ren 1 week of discharge

## 2019-10-12 NOTE — PROGRESS NOTE ADULT - ASSESSMENT
62 y/o Male who presents with a chief complaint of atypical Chest pain    # Atypical chest pain   Currently asymptomatic. However patient with risk factors and similar to prior pain when he had MI  - EKG with no changes   - cardiac enzymes negative X3  - Telemetry for 24 hours   - Continue Plavix and statin for now   - Avoid beta blockers given bradycardia   - f/u cardiology consult with Dr Horner    # Dyslipidemia   # Hypertension   - Continue HCTZ, niacin and statin     # Anxiety   - Continue Cymbalta and diazepam     GI PPX: Not indicated   DVT PPX: Lovenox   ambulate as tolerated  DASH diet  dispo: will go home once cleared by cardio  FULL CODE

## 2019-10-12 NOTE — DISCHARGE NOTE PROVIDER - CARE PROVIDER_API CALL
Harry Gregg)  Hematology; Internal Medicine; Medical Oncology  [address] [place] [address]  Phone: [phone]  Fax: [phone]  Follow Up Time:     Tyler Aguirre)  Cardiovascular Disease  [address] [place] [address]  Phone: [phone]  Fax: [phone]  Follow Up Time:

## 2019-10-12 NOTE — CONSULT NOTE ADULT - ASSESSMENT
chest pain, one time, no ecg change normal chest xary and exam, possibly an esophageal spasm, dyspepsia vs musculoskeletal pain  as per patient stress nuclear test within a year has been ok  patient insists to go home    i had a quite long talk with him, expressed the risk he is carrying (gender, age, CAD history, bypass hx) advised him to go home but be observant of the symptoms, if gets chest pain to come back  strongly advised to see dr Aguirre in 1-2 weeks and f/u with his direction  stable to be d/c home on his out patient meds

## 2019-10-12 NOTE — DISCHARGE NOTE PROVIDER - HOSPITAL COURSE
63y M w/ PMH of diverticulitis with partial colectomy (2 feet) in 1998, CAD s/p PCI to the LAD then quadruple bypass complicated by staph infection of the wound requiring IV antibiotics for 3 months, HTN, and HLD presents with chest pain that started last night. States sudden onset sharp pain radiating across b/l chest that occurred at rest and woke him up from sleep. It lasted 15 minutes before resolving after he took 4 aspirins. It was not associated with shortness of breath, diaphoresis. Did no radiate to the shoulder or arm. He says it is similar but less severe than the pain he had when he had the bypass. No alleviating/worsening factors. Denies fever, chills, SOB, n/v, abd pain, or numbness/tingling.        The patient also endorses occasional stabbing chest pains on the left side, non radiating and occurring randomly not associated with exertion or rest. He is able to go up flight of stairs without having shortness of breath or chest pain.         In the ED, his BP was 174/79, HR was 60, SpO2 was 98 on room air and temp was 37.1. First set of troponins was <0.01. EKG did not show any acute ischemic changes. The rest of the labs were unremarkable.         While in the ED, pain completely resolved. EKG no ischemic changes. trops negative X3. Seen by cardiology, Dr Costa. To be discharged with close outpatient follow up.

## 2019-10-12 NOTE — CONSULT NOTE ADULT - SUBJECTIVE AND OBJECTIVE BOX
Patient is a 63y old  Male who presents with a chief complaint of Chest pain (12 Oct 2019 08:58)      HPI:  cardiology was called to assess the patient for one time chest pain, at night woke him up took few aspirin pain resolved but his wife encouraged him to come to hospital. no chest pain, sob, palpitation, faint, syncope, since he has lizandro in the hospital has been asymptomatic  he states latest stress nuclear test was within a year, done by dr nichols  he insists to be d/c home    CC: Chest pain     63y M w/ PMH of diverticulitis with partial colectomy (2 feet) in 1998, CAD s/p PCI to the LAD then quadruple bypass complicated by staph infection of the wound requiring IV antibiotics for 3 months, HTN, and HLD presents with chest pain that started last night. States sudden onset sharp pain radiating across b/l chest that occurred at rest and woke him up from sleep. It lasted 15 minutes before resolving after he took 4 aspirins. It was not associated with shortness of breath, diaphoresis. Did no radiate to the shoulder or arm. He says it is similar but less severe than the pain he had when he had the bypass. No alleviating/worsening factors. Denies fever, chills, SOB, n/v, abd pain, or numbness/tingling.    The patient also endorses occasional stabbing chest pains on the left side, non radiating and occurring randomly not associated with exertion or rest. He is able to go up flight of stairs without having shortness of breath or chest pain.     In the ED, his BP was 174/79, HR was 60, SpO2 was 98 on room air and temp was 37.1. First set of troponins was <0.01. EKG did not show any acute ischemic changes. The rest of the labs were unremarkable. (11 Oct 2019 16:27)      PAST MEDICAL & SURGICAL HISTORY:  Diverticulitis  Hypertension  High cholesterol  H/O colectomy  History of coronary artery bypass, five      PREVIOUS DIAGNOSTIC TESTING:      no cardiac imaging test was done     MEDICATIONS  (STANDING):  clopidogrel Tablet 75 milliGRAM(s) Oral daily  diazepam    Tablet 10 milliGRAM(s) Oral daily  DULoxetine 60 milliGRAM(s) Oral at bedtime  enoxaparin Injectable 40 milliGRAM(s) SubCutaneous daily  hydrochlorothiazide 12.5 milliGRAM(s) Oral daily  niacin  milliGRAM(s) Oral at bedtime  ranolazine 500 milliGRAM(s) Oral two times a day  simvastatin 20 milliGRAM(s) Oral at bedtime    MEDICATIONS  (PRN):      FAMILY HISTORY:      SOCIAL HISTORY:  CIGARETTES: no  ALCOHOL: no  DRUGS: no                      REVIEW OF SYSTEMS:  CONSTITUTIONAL: No distress, Looks stable  NECK: No pain  RESPIRATORY: No cough, wheezing, shortness of breath  CARDIOVASCULAR: No more chest pain, SOB, palpitations, leg swelling  GASTROINTESTINAL: No abdominal or epigastric pain. No nausea, vomiting, or hematemesis;  No melena.  NEUROLOGICAL: No dizziness, headaches, memory loss, loss of strength  SKIN: No itching, burning, rashes, or lesions   ENDOCRINE: No heat or cold intolerance  MUSCULOSKELETAL: No joint pain, No  swelling; No muscle pain  PSYCHIATRIC: significant anxiety        Vital Signs Last 24 Hrs  T(C): 35.7 (12 Oct 2019 05:03), Max: 37.1 (11 Oct 2019 12:59)  T(F): 96.3 (12 Oct 2019 05:03), Max: 98.7 (11 Oct 2019 12:59)  HR: 54 (12 Oct 2019 05:03) (50 - 60)  BP: 140/70 (12 Oct 2019 05:03) (134/67 - 174/79)  BP(mean): --  RR: 18 (12 Oct 2019 05:03) (18 - 18)  SpO2: 98% (11 Oct 2019 20:17) (98% - 100%)                      PHYSICAL EXAM:  GENERAL: No distress, well developed  HEAD:  Atraumatic, Normocephalic  NECK: Supple, No JVD, No Bruit  NERVOUS SYSTEM:  Alert, Awake, Oriented to time, place, person; Normal memory and speech; Normal motor Strength 5/5 B/L upper and lower extremities  CHEST/LUNG: Normal air entry to lung base bilaterally; No wheeze, crackle, rales, rhonchi  HEART: Regular heart beat, S1, A2, P2, No S3, No gallop, No murmur  ABDOMEN: Soft, Non tender, Non distended; Bowel sounds present  EXTREMITIES:  2+ Peripheral Pulses, No clubbing, No edema    TELEMETRY: NSR    ECG: < from: 12 Lead ECG (10.11.19 @ 13:13) >  Sinus bradycardia  Left ventricular hypertrophy with repolarization abnormality    < end of copied text >      I&O's Detail    11 Oct 2019 07:01  -  12 Oct 2019 07:00  --------------------------------------------------------  IN:  Total IN: 0 mL    OUT:    Voided: 250 mL  Total OUT: 250 mL    Total NET: -250 mL      12 Oct 2019 07:01  -  12 Oct 2019 10:51  --------------------------------------------------------  IN:    Oral Fluid: 330 mL  Total IN: 330 mL    OUT:  Total OUT: 0 mL    Total NET: 330 mL          LABS:                        12.7   5.08  )-----------( 187      ( 11 Oct 2019 14:30 )             38.5     10-11    142  |  105  |  15  ----------------------------<  105<H>  4.3   |  25  |  1.0    Ca    9.4      11 Oct 2019 14:30    TPro  7.4  /  Alb  4.5  /  TBili  0.3  /  DBili  x   /  AST  19  /  ALT  14  /  AlkPhos  113  10-11    CARDIAC MARKERS ( 12 Oct 2019 00:50 )  x     / <0.01 ng/mL / 42 U/L / x     / <1.0 ng/mL  CARDIAC MARKERS ( 11 Oct 2019 21:07 )  x     / <0.01 ng/mL / 46 U/L / x     / <1.0 ng/mL  CARDIAC MARKERS ( 11 Oct 2019 14:30 )  x     / <0.01 ng/mL / x     / x     / x              I&O's Summary    11 Oct 2019 07:01  -  12 Oct 2019 07:00  --------------------------------------------------------  IN: 0 mL / OUT: 250 mL / NET: -250 mL    12 Oct 2019 07:01  -  12 Oct 2019 10:51  --------------------------------------------------------  IN: 330 mL / OUT: 0 mL / NET: 330 mL        RADIOLOGY & ADDITIONAL STUDIES: normal chest xray

## 2019-10-12 NOTE — CHART NOTE - NSCHARTNOTEFT_GEN_A_CORE
<<<RESIDENT DISCHARGE NOTE>>>     YOVANY DUNLAP  [id number]    VITAL SIGNS:  T(F): 96.3 (10-12-19 @ 05:03), Max: 98.7 (10-11-19 @ 12:59)  HR: 54 (10-12-19 @ 05:03)  BP: 140/70 (10-12-19 @ 05:03)  SpO2: 98% (10-11-19 @ 20:17)  Weight (kg): 89.8 (10-11-19 @ 22:04)  BMI (kg/m2): 30.1 (10-11-19 @ 22:04)    PHYSICAL EXAMINATION:  GENERAL: NAD, speaks in full sentences, no signs of respiratory distress  HEAD: Atraumatic  NECK: Supple  CHEST/LUNG: Clear to auscultation bilaterally; No wheeze or crackles  HEART: S1, S2; RRR; No murmurs, rubs, or gallops  ABDOMEN: BS+; Soft, Non-tender, Non-distended  EXTREMITIES:  2+ Peripheral Pulses, No clubbing, cyanosis, or edema  PSYCH: AAOx3  NEUROLOGY: non-focal  SKIN: No rashes or lesions    TEST RESULTS:                        12.7   5.08  )-----------( 187      ( 11 Oct 2019 14:30 )             38.5       10-11    142  |  105  |  15  ----------------------------<  105<H>  4.3   |  25  |  1.0    Ca    9.4      11 Oct 2019 14:30    TPro  7.4  /  Alb  4.5  /  TBili  0.3  /  DBili  x   /  AST  19  /  ALT  14  /  AlkPhos  113  10-11      FINAL DISCHARGE INTERVIEW:  Resident(s) Present: (Name: Dr Chapman)    DISCHARGE MEDICATION RECONCILIATION  reviewed with Attending (Name: Dr Ayala)    DISPOSITION:   [ X ] Home,    [  ] Home with Visiting Nursing Services,   [    ]  SNF/ NH,    [   ] Acute Rehab (4A),   [   ] Other (Specify:_________)

## 2019-10-13 LAB
HCV AB S/CO SERPL IA: 0.21 S/CO — SIGNIFICANT CHANGE UP (ref 0–0.99)
HCV AB SERPL-IMP: SIGNIFICANT CHANGE UP

## 2019-10-16 DIAGNOSIS — Z88.2 ALLERGY STATUS TO SULFONAMIDES: ICD-10-CM

## 2019-10-16 DIAGNOSIS — Z87.891 PERSONAL HISTORY OF NICOTINE DEPENDENCE: ICD-10-CM

## 2019-10-16 DIAGNOSIS — I25.10 ATHEROSCLEROTIC HEART DISEASE OF NATIVE CORONARY ARTERY WITHOUT ANGINA PECTORIS: ICD-10-CM

## 2019-10-16 DIAGNOSIS — E78.5 HYPERLIPIDEMIA, UNSPECIFIED: ICD-10-CM

## 2019-10-16 DIAGNOSIS — F41.9 ANXIETY DISORDER, UNSPECIFIED: ICD-10-CM

## 2019-10-16 DIAGNOSIS — R07.9 CHEST PAIN, UNSPECIFIED: ICD-10-CM

## 2019-10-16 DIAGNOSIS — R07.89 OTHER CHEST PAIN: ICD-10-CM

## 2019-10-16 DIAGNOSIS — I10 ESSENTIAL (PRIMARY) HYPERTENSION: ICD-10-CM

## 2019-11-21 NOTE — PATIENT PROFILE ADULT - HAS THE PATIENT RECEIVED THE INFLUENZA VACCINE THIS SEASON?
yes... [Alert] : alert [No Acute Distress] : no acute distress [Normocephalic] : normocephalic [EOMI Bilateral] : EOMI bilateral [Clear tympanic membranes with bony landmarks and light reflex present bilaterally] : clear tympanic membranes with bony landmarks and light reflex present bilaterally  [Pink Nasal Mucosa] : pink nasal mucosa [Nonerythematous Oropharynx] : nonerythematous oropharynx [Supple, full passive range of motion] : supple, full passive range of motion [No Palpable Masses] : no palpable masses [Clear to Ausculatation Bilaterally] : clear to auscultation bilaterally [Regular Rate and Rhythm] : regular rate and rhythm [Normal S1, S2 audible] : normal S1, S2 audible [No Murmurs] : no murmurs [+2 Femoral Pulses] : +2 femoral pulses [Soft] : soft [NonTender] : non tender [Non Distended] : non distended [Normoactive Bowel Sounds] : normoactive bowel sounds [No Hepatomegaly] : no hepatomegaly [No Splenomegaly] : no splenomegaly [Clayton: ____] : Clayton [unfilled] [Clayton: _____] : Clayton [unfilled] [No Abnormal Lymph Nodes Palpated] : no abnormal lymph nodes palpated [Normal Muscle Tone] : normal muscle tone [No Gait Asymmetry] : no gait asymmetry [No pain or deformities with palpation of bone, muscles, joints] : no pain or deformities with palpation of bone, muscles, joints [Straight] : straight [+2 Patella DTR] : +2 patella DTR [Cranial Nerves Grossly Intact] : cranial nerves grossly intact [No Rash or Lesions] : no rash or lesions

## 2019-12-04 PROBLEM — I10 ESSENTIAL (PRIMARY) HYPERTENSION: Chronic | Status: ACTIVE | Noted: 2019-10-11

## 2019-12-04 PROBLEM — E78.00 PURE HYPERCHOLESTEROLEMIA, UNSPECIFIED: Chronic | Status: ACTIVE | Noted: 2019-10-11

## 2019-12-04 PROBLEM — K57.92 DIVERTICULITIS OF INTESTINE, PART UNSPECIFIED, WITHOUT PERFORATION OR ABSCESS WITHOUT BLEEDING: Chronic | Status: ACTIVE | Noted: 2019-10-11

## 2019-12-07 ENCOUNTER — OUTPATIENT (OUTPATIENT)
Dept: OUTPATIENT SERVICES | Facility: HOSPITAL | Age: 64
LOS: 1 days | Discharge: HOME | End: 2019-12-07
Payer: MEDICARE

## 2019-12-07 DIAGNOSIS — Z95.1 PRESENCE OF AORTOCORONARY BYPASS GRAFT: Chronic | ICD-10-CM

## 2019-12-07 DIAGNOSIS — R10.9 UNSPECIFIED ABDOMINAL PAIN: ICD-10-CM

## 2019-12-07 DIAGNOSIS — Z90.49 ACQUIRED ABSENCE OF OTHER SPECIFIED PARTS OF DIGESTIVE TRACT: Chronic | ICD-10-CM

## 2019-12-07 PROCEDURE — 74177 CT ABD & PELVIS W/CONTRAST: CPT | Mod: 26

## 2019-12-18 ENCOUNTER — OUTPATIENT (OUTPATIENT)
Dept: OUTPATIENT SERVICES | Facility: HOSPITAL | Age: 64
LOS: 1 days | Discharge: HOME | End: 2019-12-18
Payer: MEDICARE

## 2019-12-18 ENCOUNTER — APPOINTMENT (OUTPATIENT)
Dept: CARDIOLOGY | Facility: CLINIC | Age: 64
End: 2019-12-18
Payer: MEDICARE

## 2019-12-18 ENCOUNTER — APPOINTMENT (OUTPATIENT)
Dept: CARDIOTHORACIC SURGERY | Facility: CLINIC | Age: 64
End: 2019-12-18
Payer: MEDICARE

## 2019-12-18 VITALS
HEIGHT: 68 IN | TEMPERATURE: 98 F | SYSTOLIC BLOOD PRESSURE: 97 MMHG | OXYGEN SATURATION: 94 % | HEART RATE: 74 BPM | RESPIRATION RATE: 12 BRPM | DIASTOLIC BLOOD PRESSURE: 64 MMHG

## 2019-12-18 VITALS — SYSTOLIC BLOOD PRESSURE: 131 MMHG | DIASTOLIC BLOOD PRESSURE: 82 MMHG

## 2019-12-18 DIAGNOSIS — R91.1 SOLITARY PULMONARY NODULE: ICD-10-CM

## 2019-12-18 DIAGNOSIS — Z95.1 PRESENCE OF AORTOCORONARY BYPASS GRAFT: Chronic | ICD-10-CM

## 2019-12-18 DIAGNOSIS — Z90.49 ACQUIRED ABSENCE OF OTHER SPECIFIED PARTS OF DIGESTIVE TRACT: Chronic | ICD-10-CM

## 2019-12-18 PROCEDURE — 93000 ELECTROCARDIOGRAM COMPLETE: CPT

## 2019-12-18 PROCEDURE — 99204 OFFICE O/P NEW MOD 45 MIN: CPT

## 2019-12-18 PROCEDURE — 71046 X-RAY EXAM CHEST 2 VIEWS: CPT | Mod: 26

## 2019-12-18 NOTE — HISTORY OF PRESENT ILLNESS
[FreeTextEntry1] : Mr Melo is a 63 y/o male that arrives today for a consultation regarding his lung nodule. He is known to Dr. Watt as a prior patient for CABG. His PMH is significant for diverticulitis with partial colectomy (2 feet) in 1998, CAD s/p PCI to the LAD then quadruple bypass complicated by staph infection of the wound requiring IV antibiotics for 3 months, HTN, and HLD, and anxiety. He self refereed himself to review his imaging. He complains of dizziness and frequent falls. \par \par Cardio: Mustacciulo\par PMD Dr. Harry Gregg

## 2019-12-18 NOTE — ADDENDUM
[FreeTextEntry1] : CTS Attending - the patient complains of dizziness, and has had several unexplained falls.  In my office, he demonstrated orthostatic blood pressure changes.\par \par I referred the patient to Dr. Li for evaluation and possible tilt-table testing.\par \par Otherwise, he needs a 3-month non-contrast chest CT as recommended by the radiologist in his report.

## 2019-12-18 NOTE — DATA REVIEWED
[FreeTextEntry1] : EXAM: CT ABDOMEN AND PELVIS OC IC PROCEDURE DATE: 12/07/2019 \par \par COMPARISON CT: March 1, 2013; chest CT March 6, 2015 \par \par FINDINGS: \par \par LOWER CHEST: There is a stable 5 mm subpleural nodule at the left base on \par image 9 of series 2. There is a new 6 mm ground-glass nodule at the left base \par image 13 of series 4, laterally. There are patchy areas of subsegmental \par atelectasis. \par \par IMPRESSION: \par \par No acute intra-abdominal pelvic pathology \par \par New left lower lobe 6 mm ground-glass nodule. Follow-up noncontrast chest CT \par is recommended in 3 months. \par \par \par EXAM: CT CHEST PROCEDURE DATE: 03/24/2015 \par \par Reason for Exam: Effusion. \par \par Comparison CT thorax from 3/6/15, 6/18/14 and 11/23/06 \par \par Findings: \par Lungs, Pleura, and Airways: Again seen are scattered 3-mm pulmonary nodules for example series 2, image 19 in the right upper lobe and series 2, image 33 in the right lower lobe which are stable from prior exams. No endobronchial lesions are noted.There are trace bilateral pleural effusions with associated \par atelectasis, left greater than right. There is no pneumothorax. The previously described midline chest wall low attenuating fluid collection is no longer visualized. \par \par Mediastinum/Lymph Nodes: There are subcentimeter mediastinal lymph nodes within the right paratracheal, para-aortic and subcarinal space likely reactive in nature. \par \par IMPRESSION: \par \par Interval resolution of the previously described midline anterior chest wall fluid collection. \par \par New trace bilateral pleural effusions with associated atelectasis, left greater than right. \par

## 2019-12-18 NOTE — PHYSICAL EXAM
[General Appearance - Alert] : alert [PERRL With Normal Accommodation] : pupils were equal in size, round, and reactive to light [General Appearance - In No Acute Distress] : in no acute distress [Sclera] : the sclera and conjunctiva were normal [Outer Ear] : the ears and nose were normal in appearance [Extraocular Movements] : extraocular movements were intact [Neck Appearance] : the appearance of the neck was normal [Oropharynx] : the oropharynx was normal [Jugular Venous Distention Increased] : there was no jugular-venous distention [Thyroid Diffuse Enlargement] : the thyroid was not enlarged [Neck Cervical Mass (___cm)] : no neck mass was observed [Thyroid Nodule] : there were no palpable thyroid nodules [Auscultation Breath Sounds / Voice Sounds] : lungs were clear to auscultation bilaterally [Heart Sounds] : normal S1 and S2 [Heart Rate And Rhythm] : heart rate was normal and rhythm regular [Heart Sounds Gallop] : no gallops [Murmurs] : no murmurs [Heart Sounds Pericardial Friction Rub] : no pericardial rub [Bowel Sounds] : normal bowel sounds [Abdomen Soft] : soft [Abdomen Tenderness] : non-tender [Skin Color & Pigmentation] : normal skin color and pigmentation [Abdomen Mass (___ Cm)] : no abdominal mass palpated [] : no rash [Skin Turgor] : normal skin turgor [Deep Tendon Reflexes (DTR)] : deep tendon reflexes were 2+ and symmetric [Sensation] : the sensory exam was normal to light touch and pinprick [Oriented To Time, Place, And Person] : oriented to person, place, and time [No Focal Deficits] : no focal deficits [Impaired Insight] : insight and judgment were intact [Affect] : the affect was normal

## 2019-12-18 NOTE — ASSESSMENT
[FreeTextEntry1] : Mr Melo is a 63 y/o male that arrives today for a consultation regarding his lung nodule. He is known to Dr. Watt as a prior patient for CABG. His PMH is significant for diverticulitis with partial colectomy (2 feet) in 1998, CAD s/p PCI to the LAD then quadruple bypass complicated by staph infection of the wound requiring IV antibiotics for 3 months, HTN, and HLD, and anxiety. He self refereed himself to review his imaging. He complains of dizziness and frequent falls. Two pressure  reading noted a systolic change of 40 mmHg. Orthostasis hypotension is a possible culprit to the frequent falls. Plan is to see Dr. Li for orthostasis workup. \par Lung nodule is 6mm. Not a full scan noted. Plan will be to repeat a CT chest in 3 months with a non contrast chest CT.

## 2019-12-26 ENCOUNTER — INPATIENT (INPATIENT)
Facility: HOSPITAL | Age: 64
LOS: 1 days | Discharge: HOME | End: 2019-12-28
Attending: INTERNAL MEDICINE | Admitting: INTERNAL MEDICINE
Payer: MEDICARE

## 2019-12-26 VITALS
SYSTOLIC BLOOD PRESSURE: 164 MMHG | RESPIRATION RATE: 18 BRPM | TEMPERATURE: 98 F | DIASTOLIC BLOOD PRESSURE: 67 MMHG | HEART RATE: 76 BPM | OXYGEN SATURATION: 96 %

## 2019-12-26 DIAGNOSIS — Z90.49 ACQUIRED ABSENCE OF OTHER SPECIFIED PARTS OF DIGESTIVE TRACT: Chronic | ICD-10-CM

## 2019-12-26 DIAGNOSIS — Z95.1 PRESENCE OF AORTOCORONARY BYPASS GRAFT: Chronic | ICD-10-CM

## 2019-12-26 LAB
ALBUMIN SERPL ELPH-MCNC: 4.5 G/DL — SIGNIFICANT CHANGE UP (ref 3.5–5.2)
ALP SERPL-CCNC: 122 U/L — HIGH (ref 30–115)
ALT FLD-CCNC: 21 U/L — SIGNIFICANT CHANGE UP (ref 0–41)
ANION GAP SERPL CALC-SCNC: 13 MMOL/L — SIGNIFICANT CHANGE UP (ref 7–14)
AST SERPL-CCNC: 20 U/L — SIGNIFICANT CHANGE UP (ref 0–41)
BILIRUB SERPL-MCNC: 0.3 MG/DL — SIGNIFICANT CHANGE UP (ref 0.2–1.2)
BUN SERPL-MCNC: 18 MG/DL — SIGNIFICANT CHANGE UP (ref 10–20)
CALCIUM SERPL-MCNC: 9.3 MG/DL — SIGNIFICANT CHANGE UP (ref 8.5–10.1)
CHLORIDE SERPL-SCNC: 99 MMOL/L — SIGNIFICANT CHANGE UP (ref 98–110)
CK MB CFR SERPL CALC: <1 NG/ML — SIGNIFICANT CHANGE UP (ref 0.6–6.3)
CK SERPL-CCNC: 48 U/L — SIGNIFICANT CHANGE UP (ref 0–225)
CO2 SERPL-SCNC: 27 MMOL/L — SIGNIFICANT CHANGE UP (ref 17–32)
CREAT SERPL-MCNC: 1 MG/DL — SIGNIFICANT CHANGE UP (ref 0.7–1.5)
GLUCOSE SERPL-MCNC: 112 MG/DL — HIGH (ref 70–99)
HCT VFR BLD CALC: 39.1 % — LOW (ref 42–52)
HGB BLD-MCNC: 12.6 G/DL — LOW (ref 14–18)
LIDOCAIN IGE QN: 21 U/L — SIGNIFICANT CHANGE UP (ref 7–60)
MCHC RBC-ENTMCNC: 28.8 PG — SIGNIFICANT CHANGE UP (ref 27–31)
MCHC RBC-ENTMCNC: 32.2 G/DL — SIGNIFICANT CHANGE UP (ref 32–37)
MCV RBC AUTO: 89.5 FL — SIGNIFICANT CHANGE UP (ref 80–94)
NRBC # BLD: 0 /100 WBCS — SIGNIFICANT CHANGE UP (ref 0–0)
PLATELET # BLD AUTO: 251 K/UL — SIGNIFICANT CHANGE UP (ref 130–400)
POTASSIUM SERPL-MCNC: 4.5 MMOL/L — SIGNIFICANT CHANGE UP (ref 3.5–5)
POTASSIUM SERPL-SCNC: 4.5 MMOL/L — SIGNIFICANT CHANGE UP (ref 3.5–5)
PROT SERPL-MCNC: 7.8 G/DL — SIGNIFICANT CHANGE UP (ref 6–8)
RBC # BLD: 4.37 M/UL — LOW (ref 4.7–6.1)
RBC # FLD: 13.2 % — SIGNIFICANT CHANGE UP (ref 11.5–14.5)
SODIUM SERPL-SCNC: 139 MMOL/L — SIGNIFICANT CHANGE UP (ref 135–146)
TROPONIN T SERPL-MCNC: <0.01 NG/ML — SIGNIFICANT CHANGE UP
TROPONIN T SERPL-MCNC: <0.01 NG/ML — SIGNIFICANT CHANGE UP
WBC # BLD: 6.47 K/UL — SIGNIFICANT CHANGE UP (ref 4.8–10.8)
WBC # FLD AUTO: 6.47 K/UL — SIGNIFICANT CHANGE UP (ref 4.8–10.8)

## 2019-12-26 PROCEDURE — 71045 X-RAY EXAM CHEST 1 VIEW: CPT | Mod: 26

## 2019-12-26 PROCEDURE — 99285 EMERGENCY DEPT VISIT HI MDM: CPT

## 2019-12-26 PROCEDURE — 93010 ELECTROCARDIOGRAM REPORT: CPT

## 2019-12-26 PROCEDURE — 93010 ELECTROCARDIOGRAM REPORT: CPT | Mod: 77

## 2019-12-26 RX ORDER — DULOXETINE HYDROCHLORIDE 30 MG/1
60 CAPSULE, DELAYED RELEASE ORAL DAILY
Refills: 0 | Status: DISCONTINUED | OUTPATIENT
Start: 2019-12-26 | End: 2019-12-28

## 2019-12-26 RX ORDER — DIAZEPAM 5 MG
10 TABLET ORAL DAILY
Refills: 0 | Status: DISCONTINUED | OUTPATIENT
Start: 2019-12-26 | End: 2019-12-28

## 2019-12-26 RX ORDER — KETOROLAC TROMETHAMINE 30 MG/ML
15 SYRINGE (ML) INJECTION ONCE
Refills: 0 | Status: DISCONTINUED | OUTPATIENT
Start: 2019-12-26 | End: 2019-12-26

## 2019-12-26 RX ORDER — ACETAMINOPHEN 500 MG
650 TABLET ORAL ONCE
Refills: 0 | Status: COMPLETED | OUTPATIENT
Start: 2019-12-26 | End: 2019-12-26

## 2019-12-26 RX ORDER — HYDROCHLOROTHIAZIDE 25 MG
12.5 TABLET ORAL DAILY
Refills: 0 | Status: DISCONTINUED | OUTPATIENT
Start: 2019-12-26 | End: 2019-12-28

## 2019-12-26 RX ORDER — SIMVASTATIN 20 MG/1
20 TABLET, FILM COATED ORAL AT BEDTIME
Refills: 0 | Status: DISCONTINUED | OUTPATIENT
Start: 2019-12-26 | End: 2019-12-28

## 2019-12-26 RX ORDER — RANOLAZINE 500 MG/1
500 TABLET, FILM COATED, EXTENDED RELEASE ORAL
Refills: 0 | Status: DISCONTINUED | OUTPATIENT
Start: 2019-12-26 | End: 2019-12-28

## 2019-12-26 RX ORDER — NITROGLYCERIN 6.5 MG
0.4 CAPSULE, EXTENDED RELEASE ORAL
Refills: 0 | Status: DISCONTINUED | OUTPATIENT
Start: 2019-12-26 | End: 2019-12-28

## 2019-12-26 RX ORDER — CLOPIDOGREL BISULFATE 75 MG/1
75 TABLET, FILM COATED ORAL DAILY
Refills: 0 | Status: DISCONTINUED | OUTPATIENT
Start: 2019-12-26 | End: 2019-12-28

## 2019-12-26 RX ORDER — TRAMADOL HYDROCHLORIDE 50 MG/1
50 TABLET ORAL THREE TIMES A DAY
Refills: 0 | Status: DISCONTINUED | OUTPATIENT
Start: 2019-12-26 | End: 2019-12-28

## 2019-12-26 RX ORDER — CLOPIDOGREL BISULFATE 75 MG/1
75 TABLET, FILM COATED ORAL ONCE
Refills: 0 | Status: COMPLETED | OUTPATIENT
Start: 2019-12-26 | End: 2019-12-26

## 2019-12-26 RX ORDER — NIACIN 50 MG
500 TABLET ORAL AT BEDTIME
Refills: 0 | Status: DISCONTINUED | OUTPATIENT
Start: 2019-12-26 | End: 2019-12-28

## 2019-12-26 RX ORDER — ENOXAPARIN SODIUM 100 MG/ML
40 INJECTION SUBCUTANEOUS DAILY
Refills: 0 | Status: DISCONTINUED | OUTPATIENT
Start: 2019-12-26 | End: 2019-12-28

## 2019-12-26 RX ORDER — MORPHINE SULFATE 50 MG/1
2 CAPSULE, EXTENDED RELEASE ORAL ONCE
Refills: 0 | Status: DISCONTINUED | OUTPATIENT
Start: 2019-12-26 | End: 2019-12-26

## 2019-12-26 RX ADMIN — MORPHINE SULFATE 2 MILLIGRAM(S): 50 CAPSULE, EXTENDED RELEASE ORAL at 22:00

## 2019-12-26 RX ADMIN — Medication 15 MILLIGRAM(S): at 23:00

## 2019-12-26 RX ADMIN — Medication 15 MILLIGRAM(S): at 22:33

## 2019-12-26 RX ADMIN — Medication 650 MILLIGRAM(S): at 18:47

## 2019-12-26 RX ADMIN — Medication 650 MILLIGRAM(S): at 16:26

## 2019-12-26 RX ADMIN — MORPHINE SULFATE 2 MILLIGRAM(S): 50 CAPSULE, EXTENDED RELEASE ORAL at 22:22

## 2019-12-26 NOTE — ED ADULT NURSE REASSESSMENT NOTE - NS ED NURSE REASSESS COMMENT FT1
attempted to call F9 to give report for the past 20 minutes with no success, No one answers phone.. Charge RN made aware . Bed side report given and pt transported to [id number] by RN and charge RN.

## 2019-12-26 NOTE — ED ADULT NURSE NOTE - CHPI ED NUR SYMPTOMS NEG
no syncope/no back pain/no vomiting/no chills/no fever/no congestion/no dizziness/no diaphoresis/no nausea

## 2019-12-26 NOTE — H&P ADULT - NSHPPHYSICALEXAM_GEN_ALL_CORE
PHYSICAL EXAM:  GENERAL: NAD, lying in bed comfortably  HEAD:  Atraumatic, Normocephalic  EYES: EOMI, PERRLA, conjunctiva and sclera clear  ENT: Moist mucous membranes  NECK: Supple, No JVD  CHEST/LUNG: Clear to auscultation bilaterally; No rales, rhonchi, wheezing, or rubs. Unlabored respirations. Scars from prior CABG, TTP.  HEART: Regular rate and rhythm; No murmurs, rubs, or gallops  ABDOMEN: Bowel sounds present; Soft, Nontender, Nondistended. No hepatomegaly  EXTREMITIES:  2+ Peripheral Pulses, brisk capillary refill. No clubbing, cyanosis, or edema  NERVOUS SYSTEM:  Alert & Oriented X3, speech clear. No deficits   MSK: FROM all 4 extremities, full and equal strength  SKIN: No rashes or lesions

## 2019-12-26 NOTE — ED ADULT NURSE NOTE - OBJECTIVE STATEMENT
pt presents with midsternal chest pain radiates to the right side and abdomen at times x 2 hours prior to arrival with SOB for the past month. pt states has been in the ED multiple times within the past 2 months for the same complaints.

## 2019-12-26 NOTE — ED PROVIDER NOTE - PROGRESS NOTE DETAILS
Informed patient of lab/radiology results. Agrees with plan for tele admission. Informed MAR, cards Dr. Poe. Takes Plavix 75mg daily, does not take ASA.
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
## 2019-12-26 NOTE — ED PROVIDER NOTE - CLINICAL SUMMARY MEDICAL DECISION MAKING FREE TEXT BOX
pt with history of cad, s/p cabg on plavix presents with intermittent chest pain for the past month.  pt was instructed by CT surgeon to come to the ED.  no fevers, no chills, no nausea, no vomiting.  Pt has chronic abd pain for over one month (pt had negative recent ct abd/pelvis a few weeks ago).  abd soft, nontender in the ED.  trop negative.  pt given plavix.  pt admitted to tele for further management.

## 2019-12-26 NOTE — H&P ADULT - ASSESSMENT
63y M w/ PMH of diverticulitis with partial colectomy (2 feet) in 1998, CAD s/p PCI to the LAD then quadruple bypass complicated by staph infection of the wound requiring IV antibiotics for 3 months, had another wound infection 2 years ago that needed drainage I/V ABX for 3 months, HTN, and HLD presents with chest pain.     # Atypical chest pain with hx of CAD s/p PCI and CABG with hx of infected wound  - reproducible with palpation, per it is similar pain that he experiences likely from prior scar and interventions  - likely has some underlying cad given his extensive hx, f/u cardio consult Dr Wagner for possible stress test vs ischemia w/up inpt vs outpt  - c/w plavix, statins, not on BB due to sinus bradycardia  - LDL 79, HbA1C 6.1  - Tylenol and tramadol for pain.    # hx of Dizziness  - c/w tele, check orthostatic VS  - outpt f/u with Dr Li for tilt table test    # HTN  - c/w HCTZ 12.5mg  - c/w monitoring    DVT PPx: Lovenox  GI PPX: not indicated  Diet: DASH/Carb consistent  Activity: Increase as tolerated  Dispo: from home, no needs  Code Status: full code 63y M w/ PMH of diverticulitis with partial colectomy (2 feet) in 1998, CAD s/p PCI to the LAD then quadruple bypass complicated by staph infection of the wound requiring IV antibiotics for 3 months, had another wound infection 2 years ago that needed drainage I/V ABX for 3 months, HTN, and HLD presents with chest pain.     # Atypical chest pain with hx of CAD s/p PCI and CABG with hx of infected wound  - reproducible with palpation, per it is similar pain that he experiences likely from prior scar and interventions  - no acute EKG changes, CE -ve X 2  - likely has some underlying cad given his extensive hx, f/u cardio consult Dr Wagner for possible stress test vs ischemia w/up inpt vs outpt  - c/w plavix, statins, not on BB due to sinus bradycardia  - LDL 79, HbA1C 6.1  - Tylenol and tramadol for pain.  - f/u repeat EKG and CE    # hx of Dizziness and syncope  - c/w tele, check orthostatic VS  - outpt f/u with Dr Li for tilt table test    # HTN  - c/w HCTZ 12.5mg  - c/w monitoring    DVT PPx: Lovenox  GI PPX: not indicated  Diet: DASH/Carb consistent  Activity: Increase as tolerated  Dispo: from home, no needs  Code Status: full code 63y M w/ PMH of diverticulitis with partial colectomy (2 feet) in 1998, CAD s/p PCI to the LAD then quadruple bypass complicated by staph infection of the wound requiring IV antibiotics for 3 months, had another wound infection 2 years ago that needed drainage I/V ABX for 3 months, HTN, and HLD presents with chest pain.     # Atypical chest pain with hx of CAD s/p PCI and CABG with hx of infected wound  - reproducible with palpation, per it is similar pain that he experiences likely from prior scar and interventions  - no acute EKG changes, CE -ve X 2  - likely has some underlying cad given his extensive hx, f/u cardio consult Dr Wagner for possible stress test vs ischemia w/up inpt vs outpt  - c/w plavix, statins, not on BB due to sinus bradycardia  - LDL 79, HbA1C 6.1  - Tylenol and tramadol for pain.  - f/u repeat EKG and CE, 2D echo    # hx of Dizziness and syncope  - c/w tele, check orthostatic VS, 2 D echo  - outpt f/u with Dr Li for tilt table test    # HTN  - c/w HCTZ 12.5mg  - c/w monitoring    DVT PPx: Lovenox  GI PPX: not indicated  Diet: DASH/Carb consistent  Activity: Increase as tolerated  Dispo: from home, no needs  Code Status: full code 63y M w/ PMH of diverticulitis with partial colectomy (2 feet) in 1998, CAD s/p PCI to the LAD then quadruple bypass complicated by staph infection of the wound requiring IV antibiotics for 3 months, had another wound infection 2 years ago that needed drainage I/V ABX for 3 months, HTN, and HLD presents with chest pain.     # Atypical chest pain with hx of stable angina, CAD s/p PCI and CABG with hx of infected wound  - reproducible with palpation, per it is similar pain that he experiences likely from prior scar and interventions  - no acute EKG changes, CE -ve X 2  - per pt he had -ve stress test 7 months ago  - likely has some underlying cad given his extensive hx, f/u cardio consult Dr Wagner for possible stress test vs ischemia w/up inpt vs outpt  - c/w plavix, statins, Ranexa 500mg BID, not on BB due to sinus bradycardia  - LDL 79, HbA1C 6.1  - Tylenol and tramadol for pain.  - f/u repeat EKG and CE, 2D echo    # hx of Dizziness and syncope  - c/w tele, check orthostatic VS, 2 D echo  - outpt f/u with Dr Li for tilt table test    # HTN  - c/w HCTZ 12.5mg  - c/w monitoring    DVT PPx: Lovenox  GI PPX: not indicated  Diet: DASH/Carb consistent  Activity: Increase as tolerated  Dispo: from home, no needs  Code Status: full code 63y M w/ PMH of diverticulitis with partial colectomy (2 feet) in 1998, CAD s/p PCI to the LAD then quadruple bypass complicated by staph infection of the wound requiring IV antibiotics for 3 months, had another wound infection 2 years ago that needed drainage I/V ABX for 3 months, HTN, and HLD presents with chest pain.     # Atypical chest pain with hx of stable angina, CAD s/p PCI and CABG with hx of infected wound  - reproducible with palpation, per it is similar pain that he experiences likely from prior scar and interventions  - no acute EKG changes, CE -ve X 2  - per pt he had -ve stress test 7 months ago  - likely has some underlying cad given his extensive hx, f/u cardio consult Dr Wagner for possible stress test vs ischemia w/up inpt vs outpt  - c/w plavix, statins, Ranexa 500mg BID, not on BB due to sinus bradycardia  - LDL 79, HbA1C 6.1  - Tylenol and tramadol for pain.  - f/u repeat EKG and CE, 2D echo    # hx of Dizziness and syncope  - c/w tele, check orthostatic VS, 2 D echo  - pt is on vallium, can be contributing to bradycardia and dizziness  - outpt f/u with Dr Li for tilt table test    # HTN  - c/w HCTZ 12.5mg  - c/w monitoring    # Anxiety  - Vallium prn    DVT PPx: Lovenox  GI PPX: not indicated  Diet: DASH/Carb consistent  Activity: Increase as tolerated  Dispo: from home, no needs  Code Status: full code

## 2019-12-26 NOTE — ED PROVIDER NOTE - OBJECTIVE STATEMENT
65yo M with PMH of CABG x15 years ago, diverticulitis, HLD, and HTN presenting 63yo M with PMH of CABG x15 years ago, diverticulitis, staph infections, HLD, and HTN presenting to ED with intermittent CP x 1 month. Describes the CP as intermittent, episodic, lasting up to 20 minutes, happens a few times daily, worse with exertion, and with associated dyspnea upon exertion. Denies any f/c, back pain, n/v/d, diaphoresis, HA, vision changes, syncope, paresthesias, increased LE swelling, or injuries/traumas/falls. No hx of DVT/PE. Saw Dr. Watt last week and was instructed to come to ED should CP become more frequent/concerning. Patient states he is also being monitored for a pulmonary nodule. Former smoker, quit about 15yrs ago. 65yo M with PMH of CABG x15 years ago, diverticulitis, staph infections, HLD, and HTN presenting to ED with intermittent CP x 1 month. Describes the CP as intermittent, episodic, lasting up to 20 minutes, happens a few times daily, worse with exertion, and with associated dyspnea upon exertion. Denies any f/c, back pain, n/v/d, diaphoresis, HA, vision changes, syncope, paresthesias, increased LE swelling, or injuries/traumas/falls. Has had chronic abd pain for weeks, had negative CT scan of abdomen done 2 weeks ago. No hx of DVT/PE. Saw Dr. Watt last week and was instructed to come to ED should CP become more frequent/concerning. Patient states he is also being monitored for a pulmonary nodule. Former smoker, quit about 15yrs ago.

## 2019-12-26 NOTE — H&P ADULT - NSHPLABSRESULTS_GEN_ALL_CORE
12.6   6.47  )-----------( 251      ( 26 Dec 2019 15:51 )             39.1       12-26    139  |  99  |  18  ----------------------------<  112<H>  4.5   |  27  |  1.0    Ca    9.3      26 Dec 2019 15:51    TPro  7.8  /  Alb  4.5  /  TBili  0.3  /  DBili  x   /  AST  20  /  ALT  21  /  AlkPhos  122<H>  12-26                      Lactate Trend      CARDIAC MARKERS ( 26 Dec 2019 20:01 )  x     / <0.01 ng/mL / 48 U/L / x     / <1.0 ng/mL  CARDIAC MARKERS ( 26 Dec 2019 15:51 )  x     / <0.01 ng/mL / x     / x     / x            CAPILLARY BLOOD GLUCOSE    < from: 12 Lead ECG (12.26.19 @ 13:32) >    Diagnosis Line Normal sinus rhythm with sinus arrhythmia  Cannot rule out Anterior infarct , age undetermined  Abnormal ECG    < end of copied text >

## 2019-12-26 NOTE — ED PROVIDER NOTE - NS ED ROS FT
Constitutional:  No fevers or chills.  Eyes:  No visual changes, eye pain, or discharge.  ENT:  No hearing changes. No sore throat.  Neck:  No neck pain or stiffness.  Cardiac:  +CP.  Resp:  No cough. No hemoptysis.   GI:  No nausea, vomiting, diarrhea. +Chronic mild abd pain, none currently.  :  No dysuria, frequency, or hematuria.  MSK:  No myalgias or joint pain/swelling.  Neuro:  No headache, dizziness, or weakness.  Skin:  No skin rash.

## 2019-12-26 NOTE — H&P ADULT - HISTORY OF PRESENT ILLNESS
63y M w/ PMH of diverticulitis with partial colectomy (2 feet) in 1998, CAD s/p PCI to the LAD then quadruple bypass complicated by staph infection of the wound requiring IV antibiotics for 3 months, had another wound infection 2 years ago that needed drainage I/V ABX for 3 months, HTN, and HLD presents with chest pain. Per pt he has hx of episodic chest pain that has been going on for a while. He can pin point the spots on his chest where he gets the pain, it lasts for a few minutes, is exacerbated by physical activity particularly when he climbs stairs, it is not associated with any other symptoms, sometimes he gets these episodes even at rest. These episodes are also associated with dyspnea on exertion. He denied lifting heavy objects, recent travel, exercise, or sweating. Per pt he follows up with Dr Quiles and had -ve stress test earlier this year. Pt states that he also has episodes of dizziness and passed out with hx of fall most recent episode was 2 weeks ago. He was told to f/u with Dr Li who has scheduled the pt for stress test and tilt table test. Pt denied any hx of fever, chills, nausea, vomiting, diarrhea, constipation, melena, pain in abdomen, cough, increased urinary frequency, dysuria, headache, any changes in weight, localized weakness or numbness/tingling.    In the ER his BP was 164/67 mmHg, HR 76, T max 98, RR 18, SPO2 96 %. His CE were -ve X 2 and his EKG did not show acute changes.

## 2019-12-27 LAB
ALBUMIN SERPL ELPH-MCNC: 4.4 G/DL — SIGNIFICANT CHANGE UP (ref 3.5–5.2)
ALP SERPL-CCNC: 101 U/L — SIGNIFICANT CHANGE UP (ref 30–115)
ALT FLD-CCNC: 19 U/L — SIGNIFICANT CHANGE UP (ref 0–41)
ANION GAP SERPL CALC-SCNC: 14 MMOL/L — SIGNIFICANT CHANGE UP (ref 7–14)
AST SERPL-CCNC: 20 U/L — SIGNIFICANT CHANGE UP (ref 0–41)
BASOPHILS # BLD AUTO: 0.01 K/UL — SIGNIFICANT CHANGE UP (ref 0–0.2)
BASOPHILS NFR BLD AUTO: 0.2 % — SIGNIFICANT CHANGE UP (ref 0–1)
BILIRUB SERPL-MCNC: 0.4 MG/DL — SIGNIFICANT CHANGE UP (ref 0.2–1.2)
BUN SERPL-MCNC: 25 MG/DL — HIGH (ref 10–20)
CALCIUM SERPL-MCNC: 9.2 MG/DL — SIGNIFICANT CHANGE UP (ref 8.5–10.1)
CHLORIDE SERPL-SCNC: 99 MMOL/L — SIGNIFICANT CHANGE UP (ref 98–110)
CO2 SERPL-SCNC: 27 MMOL/L — SIGNIFICANT CHANGE UP (ref 17–32)
CREAT SERPL-MCNC: 1.2 MG/DL — SIGNIFICANT CHANGE UP (ref 0.7–1.5)
EOSINOPHIL # BLD AUTO: 0.12 K/UL — SIGNIFICANT CHANGE UP (ref 0–0.7)
EOSINOPHIL NFR BLD AUTO: 2.1 % — SIGNIFICANT CHANGE UP (ref 0–8)
GLUCOSE SERPL-MCNC: 122 MG/DL — HIGH (ref 70–99)
HCT VFR BLD CALC: 34.5 % — LOW (ref 42–52)
HGB BLD-MCNC: 11.1 G/DL — LOW (ref 14–18)
IMM GRANULOCYTES NFR BLD AUTO: 1.2 % — HIGH (ref 0.1–0.3)
LYMPHOCYTES # BLD AUTO: 2.39 K/UL — SIGNIFICANT CHANGE UP (ref 1.2–3.4)
LYMPHOCYTES # BLD AUTO: 41.3 % — SIGNIFICANT CHANGE UP (ref 20.5–51.1)
MAGNESIUM SERPL-MCNC: 2.2 MG/DL — SIGNIFICANT CHANGE UP (ref 1.8–2.4)
MCHC RBC-ENTMCNC: 28.8 PG — SIGNIFICANT CHANGE UP (ref 27–31)
MCHC RBC-ENTMCNC: 32.2 G/DL — SIGNIFICANT CHANGE UP (ref 32–37)
MCV RBC AUTO: 89.6 FL — SIGNIFICANT CHANGE UP (ref 80–94)
MONOCYTES # BLD AUTO: 0.7 K/UL — HIGH (ref 0.1–0.6)
MONOCYTES NFR BLD AUTO: 12.1 % — HIGH (ref 1.7–9.3)
NEUTROPHILS # BLD AUTO: 2.5 K/UL — SIGNIFICANT CHANGE UP (ref 1.4–6.5)
NEUTROPHILS NFR BLD AUTO: 43.1 % — SIGNIFICANT CHANGE UP (ref 42.2–75.2)
NRBC # BLD: 0 /100 WBCS — SIGNIFICANT CHANGE UP (ref 0–0)
PLATELET # BLD AUTO: 211 K/UL — SIGNIFICANT CHANGE UP (ref 130–400)
POTASSIUM SERPL-MCNC: 4 MMOL/L — SIGNIFICANT CHANGE UP (ref 3.5–5)
POTASSIUM SERPL-SCNC: 4 MMOL/L — SIGNIFICANT CHANGE UP (ref 3.5–5)
PROT SERPL-MCNC: 6.8 G/DL — SIGNIFICANT CHANGE UP (ref 6–8)
RBC # BLD: 3.85 M/UL — LOW (ref 4.7–6.1)
RBC # FLD: 13.4 % — SIGNIFICANT CHANGE UP (ref 11.5–14.5)
SODIUM SERPL-SCNC: 140 MMOL/L — SIGNIFICANT CHANGE UP (ref 135–146)
TROPONIN T SERPL-MCNC: <0.01 NG/ML — SIGNIFICANT CHANGE UP
WBC # BLD: 5.79 K/UL — SIGNIFICANT CHANGE UP (ref 4.8–10.8)
WBC # FLD AUTO: 5.79 K/UL — SIGNIFICANT CHANGE UP (ref 4.8–10.8)

## 2019-12-27 PROCEDURE — 78452 HT MUSCLE IMAGE SPECT MULT: CPT | Mod: 26

## 2019-12-27 PROCEDURE — 93306 TTE W/DOPPLER COMPLETE: CPT | Mod: 26

## 2019-12-27 PROCEDURE — 93018 CV STRESS TEST I&R ONLY: CPT

## 2019-12-27 PROCEDURE — 99222 1ST HOSP IP/OBS MODERATE 55: CPT

## 2019-12-27 PROCEDURE — 93016 CV STRESS TEST SUPVJ ONLY: CPT

## 2019-12-27 RX ORDER — LANOLIN ALCOHOL/MO/W.PET/CERES
5 CREAM (GRAM) TOPICAL AT BEDTIME
Refills: 0 | Status: DISCONTINUED | OUTPATIENT
Start: 2019-12-27 | End: 2019-12-28

## 2019-12-27 RX ORDER — PANTOPRAZOLE SODIUM 20 MG/1
40 TABLET, DELAYED RELEASE ORAL ONCE
Refills: 0 | Status: COMPLETED | OUTPATIENT
Start: 2019-12-27 | End: 2019-12-27

## 2019-12-27 RX ORDER — MORPHINE SULFATE 50 MG/1
2 CAPSULE, EXTENDED RELEASE ORAL ONCE
Refills: 0 | Status: DISCONTINUED | OUTPATIENT
Start: 2019-12-27 | End: 2019-12-27

## 2019-12-27 RX ORDER — REGADENOSON 0.08 MG/ML
0.4 INJECTION, SOLUTION INTRAVENOUS ONCE
Refills: 0 | Status: COMPLETED | OUTPATIENT
Start: 2019-12-27 | End: 2019-12-27

## 2019-12-27 RX ADMIN — Medication 10 MILLIGRAM(S): at 07:45

## 2019-12-27 RX ADMIN — TRAMADOL HYDROCHLORIDE 50 MILLIGRAM(S): 50 TABLET ORAL at 02:39

## 2019-12-27 RX ADMIN — ENOXAPARIN SODIUM 40 MILLIGRAM(S): 100 INJECTION SUBCUTANEOUS at 13:10

## 2019-12-27 RX ADMIN — CLOPIDOGREL BISULFATE 75 MILLIGRAM(S): 75 TABLET, FILM COATED ORAL at 13:10

## 2019-12-27 RX ADMIN — Medication 12.5 MILLIGRAM(S): at 05:54

## 2019-12-27 RX ADMIN — TRAMADOL HYDROCHLORIDE 50 MILLIGRAM(S): 50 TABLET ORAL at 20:24

## 2019-12-27 RX ADMIN — REGADENOSON 0.4 MILLIGRAM(S): 0.08 INJECTION, SOLUTION INTRAVENOUS at 11:34

## 2019-12-27 RX ADMIN — DULOXETINE HYDROCHLORIDE 60 MILLIGRAM(S): 30 CAPSULE, DELAYED RELEASE ORAL at 13:10

## 2019-12-27 RX ADMIN — Medication 500 MILLIGRAM(S): at 21:09

## 2019-12-27 RX ADMIN — TRAMADOL HYDROCHLORIDE 50 MILLIGRAM(S): 50 TABLET ORAL at 19:50

## 2019-12-27 RX ADMIN — RANOLAZINE 500 MILLIGRAM(S): 500 TABLET, FILM COATED, EXTENDED RELEASE ORAL at 17:09

## 2019-12-27 RX ADMIN — Medication 30 MILLILITER(S): at 14:30

## 2019-12-27 RX ADMIN — TRAMADOL HYDROCHLORIDE 50 MILLIGRAM(S): 50 TABLET ORAL at 02:06

## 2019-12-27 RX ADMIN — SIMVASTATIN 20 MILLIGRAM(S): 20 TABLET, FILM COATED ORAL at 21:09

## 2019-12-27 RX ADMIN — PANTOPRAZOLE SODIUM 40 MILLIGRAM(S): 20 TABLET, DELAYED RELEASE ORAL at 16:40

## 2019-12-27 RX ADMIN — RANOLAZINE 500 MILLIGRAM(S): 500 TABLET, FILM COATED, EXTENDED RELEASE ORAL at 05:54

## 2019-12-27 RX ADMIN — MORPHINE SULFATE 2 MILLIGRAM(S): 50 CAPSULE, EXTENDED RELEASE ORAL at 16:47

## 2019-12-27 NOTE — PROGRESS NOTE ADULT - ASSESSMENT
63y M w/ PMH of diverticulitis with partial colectomy (2 feet) in 1998, CAD s/p PCI to the LAD then quadruple bypass complicated by staph infection of the wound requiring IV antibiotics for 3 months, had another wound infection 2 years ago that needed drainage I/V ABX for 3 months, HTN, and HLD presents with chest pain.     # Atypical chest pain with hx of stable angina, CAD s/p PCI and CABG with hx of infected wound-- doubt ACS   - reproducible with palpation, per it is similar pain that he experiences likely from prior scar and interventions  - no acute EKG changes, CE -ve X 3  - per pt he had -ve stress test 7 months ago  - - c/w plavix, statins, Ranexa 500mg BID, not on BB due to sinus bradycardia  - LDL 79, HbA1C 6.1  - Tylenol and tramadol for pain.  -fu echo and Dr valiente consult     # hx of Dizziness and syncope  - c/w tele, negative orthostatic VS, 2 D echo  - pt is on valium, can be contributing to bradycardia and dizziness  - outpt f/u with Dr Li for tilt table test    # HTN  - c/w HCTZ 12.5mg  - c/w monitoring    # Anxiety  - Vallium prn    DVT PPx: Lovenox  GI PPX: not indicated  Diet: DASH/Carb consistent  Activity: Increase as tolerated  Dispo: from home, no needs  Code Status: full code 63y M w/ PMH of diverticulitis with partial colectomy (2 feet) in 1998, CAD s/p PCI to the LAD then quadruple bypass complicated by staph infection of the wound requiring IV antibiotics for 3 months, had another wound infection 2 years ago that needed drainage I/V ABX for 3 months, HTN, and HLD presents with chest pain.     # Atypical chest pain with hx of stable angina, CAD s/p PCI and CABG with hx of infected wound-- doubt cardiac   - no acute EKG changes, CE -ve X 3  - per pt he had -ve stress test 7 months ago  - - c/w plavix, statins, Ranexa 500mg BID, not on BB due to sinus bradycardia  - LDL 79, HbA1C 6.1  - Tylenol and tramadol for pain.  -fu echo and Dr valiente consult and stress test     # hx of Dizziness and syncope  - c/w tele, negative orthostatic, VS, 2 D echo  - pt is on valium, can be contributing to bradycardia and dizziness  - outpt f/u with Dr Li for tilt table test    #abd pain non specific   as per attending RUQ us to fu     # HTN  - c/w HCTZ 12.5mg  - c/w monitoring    # Anxiety  - Vallium prn    DVT PPx: Lovenox  GI PPX: not indicated  Diet: DASH/Carb consistent  Activity: Increase as tolerated  Dispo: from home, no needs  Code Status: full code

## 2019-12-27 NOTE — CONSULT NOTE ADULT - ASSESSMENT
Patient is a 64y old  Male who presents with a chief complaint of chest pain (27 Dec 2019 09:09)    Recurrent falls associated with dizziness when standing from a sitting position, most likely due to orthostatic hypotension from neuropathy or early PD  CAD s/p CABG    Recommendations:   Tilt-table test  ECHO noted   f/u Lexiscan Patient is a 64y old  Male who presents with a chief complaint of chest pain (27 Dec 2019 09:09)    Recurrent falls associated with dizziness when standing from a sitting position, most likely due to orthostatic hypotension from neuropathy or early PD  CAD s/p CABG 6 yrs ago    Recommendations:   Tilt-table test  work up for CAD   ECHO noted   f/u Lexiscan

## 2019-12-27 NOTE — PROGRESS NOTE ADULT - SUBJECTIVE AND OBJECTIVE BOX
INTERVAL HISTORY: No overnight events.  	  MEDICATIONS:  aluminum hydroxide/magnesium hydroxide/simethicone Suspension 30 milliLiter(s) Oral every 6 hours PRN  clopidogrel Tablet 75 milliGRAM(s) Oral daily  diazepam    Tablet 10 milliGRAM(s) Oral daily PRN  DULoxetine 60 milliGRAM(s) Oral daily  enoxaparin Injectable 40 milliGRAM(s) SubCutaneous daily  hydrochlorothiazide 12.5 milliGRAM(s) Oral daily  niacin  milliGRAM(s) Oral at bedtime  nitroglycerin     SubLingual 0.4 milliGRAM(s) SubLingual every 5 minutes PRN  ranolazine 500 milliGRAM(s) Oral two times a day  simvastatin 20 milliGRAM(s) Oral at bedtime  traMADol 50 milliGRAM(s) Oral three times a day PRN      REVIEW OF SYSTEMS:  CONSTITUTIONAL: No fever, weight loss, or fatigue  NECK: No pain or stiffness  RESPIRATORY: No cough, wheezing, chills or hemoptysis; No shortness of breath  CARDIOVASCULAR: No chest pain, palpitations, dizziness, or leg swelling  GASTROINTESTINAL: No abdominal or epigastric pain. No nausea, vomiting, or hematemesis; No diarrhea or constipation. No melena or hematochezia.  GENITOURINARY: No dysuria, frequency, hematuria, or incontinence  NEUROLOGICAL: No headaches, memory loss, loss of strength, numbness, or tremors  SKIN: No itching, burning, rashes, or lesions   ENDOCRINE: No heat or cold intolerance; No hair loss  MUSCULOSKELETAL: No joint pain or swelling; No muscle, back, or extremity pain  HEME/LYMPH: No easy bruising, or bleeding gums    PHYSICAL EXAM:  T(C): 36.2 (12-27-19 @ 05:34), Max: 36.2 (12-26-19 @ 20:00)  HR: 61 (12-27-19 @ 13:57) (53 - 61)  BP: 138/65 (12-27-19 @ 13:57) (138/65 - 149/68)  RR: 18 (12-27-19 @ 13:57) (18 - 18)  SpO2: 97% (12-26-19 @ 20:10) (97% - 97%)  Wt(kg): --  I&O's Summary    Height (cm): 172.72 (12-27 @ 09:09)  Weight (kg): 96.8 (12-27 @ 09:09)  BMI (kg/m2): 32.4 (12-27 @ 09:09)  BSA (m2): 2.1 (12-27 @ 09:09)    GENERAL: NAD, well-groomed, well-developed  HEAD:  Atraumatic, Normocephalic  NECK: Supple, No JVD, Normal thyroid  NERVOUS SYSTEM:  Alert & Oriented X3, Good concentration  CHEST/LUNG: Clear to percussion bilaterally; No rales, rhonchi, wheezing, or rubs  HEART: Regular rate and rhythm; No murmurs, rubs, or gallops  ABDOMEN: Soft, Nontender, Nondistended; Bowel sounds present  EXTREMITIES:  2+ Peripheral Pulses, No clubbing, cyanosis, or edema  SKIN: No rashes or lesions    LABS:	 	    CARDIAC MARKERS ( 27 Dec 2019 05:54 )  x     / <0.01 ng/mL / x     / x     / x      CARDIAC MARKERS ( 26 Dec 2019 20:01 )  x     / <0.01 ng/mL / 48 U/L / x     / <1.0 ng/mL  CARDIAC MARKERS ( 26 Dec 2019 15:51 )  x     / <0.01 ng/mL / x     / x     / x                                11.1   5.79  )-----------( 211      ( 27 Dec 2019 05:54 )             34.5     12-27    140  |  99  |  25<H>  ----------------------------<  122<H>  4.0   |  27  |  1.2    Ca    9.2      27 Dec 2019 05:54  Mg     2.2     12-27    TPro  6.8  /  Alb  4.4  /  TBili  0.4  /  DBili  x   /  AST  20  /  ALT  19  /  AlkPhos  101  12-27    proBNP:   Lipid Profile:

## 2019-12-27 NOTE — CONSULT NOTE ADULT - SUBJECTIVE AND OBJECTIVE BOX
Patient is a 64y old  Male who presents with a chief complaint of chest pain (27 Dec 2019 09:09)        HPI: 63y M w/ PMH of diverticulitis with partial colectomy (2 feet) in , CAD s/p PCI to the LAD then quadruple bypass complicated by staph infection of the wound requiring IV antibiotics for 3 months, had another wound infection 2 years ago that needed drainage I/V ABX for 3 months, HTN, and HLD presents with chest pain. Per pt he has hx of episodic chest pain that has been going on for a while. He can pin point the spots on his chest where he gets the pain, it lasts for a few minutes, is exacerbated by physical activity particularly when he climbs stairs, it is not associated with any other symptoms, sometimes he gets these episodes even at rest. These episodes are also associated with dyspnea on exertion. He denied lifting heavy objects, recent travel, exercise, or sweating. Per pt he follows up with Dr Quiles and had -ve stress test earlier this year. Pt states that he also has episodes of dizziness and passed out with hx of fall most recent episode was 2 weeks ago. He was told to f/u with Dr Li who has scheduled the pt for stress test and tilt table test. Pt denied any hx of fever, chills, nausea, vomiting, diarrhea, constipation, melena, pain in abdomen, cough, increased urinary frequency, dysuria, headache, any changes in weight, localized weakness or numbness/tingling.    In the ER his BP was 164/67 mmHg, HR 76, T max 98, RR 18, SPO2 96 %. His CE were -ve X 2 and his EKG did not show acute changes. (26 Dec 2019 22:08)    EP: 64 male with CAD s/p CABG seen in the office of Dr Li for recurrent falls. Reports dizziness when standing up, followed by loss of balance and associated with intermittent sharp substernal chest pain, lasting 15-20 minutes. Also complains of GONZALEZ since he had his CABG about 5-6 years ago. Denies palpitations, LE swelling, orthopnea, PND.        PAST MEDICAL & SURGICAL HISTORY:  Diverticulitis  Hypertension  High cholesterol  H/O colectomy  History of coronary artery bypass, five      PREVIOUS DIAGNOSTIC TESTING:      ECHO  FINDINGS:  < from: Transthoracic Echocardiogram (19 @ 10:10) >  PHYSICIAN INTERPRETATION:  Left Ventricle: The left ventricular internal cavity size is normal. Left ventricular wall thickness is normal. Normal segmental left ventricular systolic function. Abnormal (paradoxical) septal motion consistent with post-operative status.  Right Ventricle: Normal right ventricular size and function.  Left Atrium: Mildly enlarged left atrium.  Right Atrium: Normal right atrial size.  Pericardium: There is no evidence of pericardial effusion.  Mitral Valve: Structurally normal mitral valve, with normal leaflet excursion. Trace mitral valve regurgitation is seen.  Tricuspid Valve: The tricuspid valve is normal in structure. Mild tricuspid regurgitation is visualized.  Aortic Valve: The aortic valve is trileaflet. No evidence of aortic stenosis. Aortic valve thickening with normal leaflet opening. No evidence of aortic valve regurgitation is seen.  Pulmonic Valve: Mild pulmonic valve regurgitation.  Aorta: The aortic root is normal in size and structure.    < end of copied text >    MEDICATIONS  (STANDING):  clopidogrel Tablet 75 milliGRAM(s) Oral daily  DULoxetine 60 milliGRAM(s) Oral daily  enoxaparin Injectable 40 milliGRAM(s) SubCutaneous daily  hydrochlorothiazide 12.5 milliGRAM(s) Oral daily  niacin  milliGRAM(s) Oral at bedtime  ranolazine 500 milliGRAM(s) Oral two times a day  regadenoson Injectable 0.4 milliGRAM(s) IV Push once  simvastatin 20 milliGRAM(s) Oral at bedtime    MEDICATIONS  (PRN):  diazepam    Tablet 10 milliGRAM(s) Oral daily PRN anxiety  nitroglycerin     SubLingual 0.4 milliGRAM(s) SubLingual every 5 minutes PRN Chest Pain  traMADol 50 milliGRAM(s) Oral three times a day PRN Moderate Pain (4 - 6)      FAMILY HISTORY:  No premature CAD    SOCIAL HISTORY:    CIGARETTES: Former smoker, quit in     ALCOHOL: denies    Past Surgical History:    Allergies:    amiodarone (Unknown)  sulfa drugs (Unknown)      REVIEW OF SYSTEMS:    CONSTITUTIONAL: No fever, weight loss, chills, shakes, or fatigue  EYES: No eye pain, visual disturbances, or discharge  ENMT:  No difficulty hearing, tinnitus, vertigo; No sinus or throat pain  NECK: No pain or stiffness  BREASTS: No pain, masses, or nipple discharge  RESPIRATORY: No cough, wheezing, hemoptysis, or shortness of breath  CARDIOVASCULAR: see HPI.  GASTROINTESTINAL: No abdominal  or epigastric pain, nausea, vomiting, hematemesis, diarrhea, constipation, melena or bright red blood.  GENITOURINARY: No dysuria, nocturia, hematuria, or urinary incontinence  NEUROLOGICAL: No headaches, memory loss, slurred speech, limb weakness, loss of strength, numbness, or tremors  SKIN: No itching, burning, rashes, or lesions   LYMPH NODES: No enlarged glands  ENDOCRINE: No heat or cold intolerance, or hair loss  MUSCULOSKELETAL: No joint pain or swelling, muscle, back, or extremity pain  PSYCHIATRIC: No depression, anxiety, or difficulty sleeping  HEME/LYMPH: No easy bruising or bleeding gums  ALLERY AND IMMUNOLOGIC: No hives or rash.      Vital Signs Last 24 Hrs  T(C): 36.2 (27 Dec 2019 05:34), Max: 36.7 (26 Dec 2019 13:25)  T(F): 97.1 (27 Dec 2019 05:34), Max: 98 (26 Dec 2019 13:25)  HR: 53 (26 Dec 2019 20:00) (53 - 76)  BP: 149/68 (26 Dec 2019 20:00) (149/68 - 164/67)  BP(mean): --  RR: 18 (27 Dec 2019 05:34) (18 - 18)  SpO2: 97% (26 Dec 2019 20:10) (96% - 97%)    PHYSICAL EXAM:        GENERAL: In no apparent distress, well nourished, and hydrated.  HEAD:  Atraumatic, Normocephalic  EYES: EOMI, PERRLA, conjunctiva and sclera clear  ENMT: No tonsillar erythema, exudates, or enlargements; ist mucous membranes, Good dentition, No lesions  NECK: Supple and normal thyroid.  No JVD or carotid bruit.  Carotid pulse is 2+ bilaterally.  HEART: Regular rate and rhythm; No murmurs, rubs, or gallops.  PULMONARY: Clear to auscultation and perfusion.  No rales, wheezing, or rhonchi bilaterally.  ABDOMEN: Soft, Nontender, Nondistended; Bowel sounds present  EXTREMITIES:  2+ Peripheral Pulses, No clubbing, cyanosis, or edema  LYMPH: No lymphadenopathy noted  NEUROLOGICAL: Grossly nonfocal      INTERPRETATION OF TELEMETRY: no events    ECG: Sinus with sinus arrhythmia    I&O's Detail      LABS:                        11.1   5.79  )-----------( 211      ( 27 Dec 2019 05:54 )             34.5         140  |  99  |  25<H>  ----------------------------<  122<H>  4.0   |  27  |  1.2    Ca    9.2      27 Dec 2019 05:54  Mg     2.2         TPro  6.8  /  Alb  4.4  /  TBili  0.4  /  DBili  x   /  AST  20  /  ALT  19  /  AlkPhos  101      CARDIAC MARKERS ( 27 Dec 2019 05:54 )  x     / <0.01 ng/mL / x     / x     / x      CARDIAC MARKERS ( 26 Dec 2019 20:01 )  x     / <0.01 ng/mL / 48 U/L / x     / <1.0 ng/mL  CARDIAC MARKERS ( 26 Dec 2019 15:51 )  x     / <0.01 ng/mL / x     / x     / x              BNP  I&O's Detail    Daily Height in cm: 172.72 (27 Dec 2019 09:09)    Daily Weight in k.9 (27 Dec 2019 05:34)    RADIOLOGY & ADDITIONAL STUDIES:

## 2019-12-27 NOTE — CONSULT NOTE ADULT - SUBJECTIVE AND OBJECTIVE BOX
Patient is a 64y old  Male who presents with a chief complaint of chest pain (27 Dec 2019 07:34)      HPI:  63y M w/ PMH of diverticulitis with partial colectomy (2 feet) in 1998, CAD s/p PCI to the LAD then quadruple bypass complicated by staph infection of the wound requiring IV antibiotics for 3 months, had another wound infection 2 years ago that needed drainage I/V ABX for 3 months, HTN, and HLD presents with chest pain. Per pt he has hx of episodic chest pain that has been going on for a while. He can pin point the spots on his chest where he gets the pain, it lasts for a few minutes, is exacerbated by physical activity particularly when he climbs stairs, it is not associated with any other symptoms, sometimes he gets these episodes even at rest. These episodes are also associated with dyspnea on exertion. He denied lifting heavy objects, recent travel, exercise, or sweating. Per pt he follows up with Dr Quiles and had -ve stress test earlier this year. Pt states that he also has episodes of dizziness and passed out with hx of fall most recent episode was 2 weeks ago. He was told to f/u with Dr Li who has scheduled the pt for stress test and tilt table test. Pt denied any hx of fever, chills, nausea, vomiting, diarrhea, constipation, melena, pain in abdomen, cough, increased urinary frequency, dysuria, headache, any changes in weight, localized weakness or numbness/tingling.    In the ER his BP was 164/67 mmHg, HR 76, T max 98, RR 18, SPO2 96 %. His CE were -ve X 2 and his EKG did not show acute changes. (26 Dec 2019 22:08)      PAST MEDICAL & SURGICAL HISTORY:  Diverticulitis  Hypertension  High cholesterol  H/O colectomy  History of coronary artery bypass, five      PREVIOUS DIAGNOSTIC TESTING:      ECHO  FINDINGS:    STRESS  FINDINGS:    CATHETERIZATION  FINDINGS:    MEDICATIONS  (STANDING):  clopidogrel Tablet 75 milliGRAM(s) Oral daily  DULoxetine 60 milliGRAM(s) Oral daily  enoxaparin Injectable 40 milliGRAM(s) SubCutaneous daily  hydrochlorothiazide 12.5 milliGRAM(s) Oral daily  niacin  milliGRAM(s) Oral at bedtime  ranolazine 500 milliGRAM(s) Oral two times a day  simvastatin 20 milliGRAM(s) Oral at bedtime    MEDICATIONS  (PRN):  diazepam    Tablet 10 milliGRAM(s) Oral daily PRN anxiety  nitroglycerin     SubLingual 0.4 milliGRAM(s) SubLingual every 5 minutes PRN Chest Pain  traMADol 50 milliGRAM(s) Oral three times a day PRN Moderate Pain (4 - 6)      FAMILY HISTORY:      SOCIAL HISTORY:  CIGARETTES:    ALCOHOL:    REVIEW OF SYSTEMS:  CONSTITUTIONAL: No fever, weight loss, or fatigue  NECK: No pain or stiffness  RESPIRATORY: No cough, wheezing, chills or hemoptysis; No shortness of breath  CARDIOVASCULAR: No chest pain, palpitations, dizziness, or leg swelling  GASTROINTESTINAL: No abdominal or epigastric pain. No nausea, vomiting, or hematemesis; No diarrhea or constipation. No melena or hematochezia.  GENITOURINARY: No dysuria, frequency, hematuria, or incontinence  NEUROLOGICAL: No headaches, memory loss, loss of strength, numbness, or tremors  SKIN: No itching, burning, rashes, or lesions   ENDOCRINE: No heat or cold intolerance; No hair loss  MUSCULOSKELETAL: No joint pain or swelling; No muscle, back, or extremity pain  HEME/LYMPH: No easy bruising, or bleeding gums          Vital Signs Last 24 Hrs  T(C): 36.2 (27 Dec 2019 05:34), Max: 36.7 (26 Dec 2019 13:25)  T(F): 97.1 (27 Dec 2019 05:34), Max: 98 (26 Dec 2019 13:25)  HR: 53 (26 Dec 2019 20:00) (53 - 76)  BP: 149/68 (26 Dec 2019 20:00) (149/68 - 164/67)  BP(mean): --  RR: 18 (27 Dec 2019 05:34) (18 - 18)  SpO2: 97% (26 Dec 2019 20:10) (96% - 97%)        PHYSICAL EXAM:  GENERAL: NAD, well-groomed, well-developed  HEAD:  Atraumatic, Normocephalic  NECK: Supple, No JVD, Normal thyroid  NERVOUS SYSTEM:  Alert & Oriented X3, Good concentration  CHEST/LUNG: Clear to percussion bilaterally; No rales, rhonchi, wheezing, or rubs  HEART: Regular rate and rhythm; No murmurs, rubs, or gallops  ABDOMEN: Soft, Nontender, Nondistended; Bowel sounds present  EXTREMITIES:  2+ Peripheral Pulses, No clubbing, cyanosis, or edema  SKIN: No rashes or lesions    INTERPRETATION OF TELEMETRY:    ECG:    I&O's Detail      LABS:                        11.1   5.79  )-----------( 211      ( 27 Dec 2019 05:54 )             34.5     12-27    140  |  99  |  25<H>  ----------------------------<  122<H>  4.0   |  27  |  1.2    Ca    9.2      27 Dec 2019 05:54  Mg     2.2     12-27    TPro  6.8  /  Alb  4.4  /  TBili  0.4  /  DBili  x   /  AST  20  /  ALT  19  /  AlkPhos  101  12-27    CARDIAC MARKERS ( 27 Dec 2019 05:54 )  x     / <0.01 ng/mL / x     / x     / x      CARDIAC MARKERS ( 26 Dec 2019 20:01 )  x     / <0.01 ng/mL / 48 U/L / x     / <1.0 ng/mL  CARDIAC MARKERS ( 26 Dec 2019 15:51 )  x     / <0.01 ng/mL / x     / x     / x              I&O's Summary      RADIOLOGY & ADDITIONAL STUDIES:

## 2019-12-27 NOTE — ED ADULT NURSE NOTE - CAS EDN INTEG ASSESS
----- Message from Paul Moore RN sent at 10/22/2019  8:34 AM CDT -----  Regardin yr CLN recall  Haydee Lerma CMA  [facility]    Recall colon in 2 years per PL.  Colon done 17
2nd recall letter mailed out to pt.
3rd and final recall letter mailed out to pt.
Recall letter mailed out to pt.
WDL

## 2019-12-27 NOTE — PROGRESS NOTE ADULT - SUBJECTIVE AND OBJECTIVE BOX
YOVANY DUNLAP 64y Male  MRN#: [id number]   CODE STATUS: full code     SUBJECTIVE  Patient is a 64y old Male who presents with a chief complaint of chest pain (26 Dec 2019 22:08)  Currently admitted to medicine with the primary diagnosis of Chest pain    Today is hospital day 1d, and this morning he is stable; he denies any major events overnight       OBJECTIVE  PAST MEDICAL & SURGICAL HISTORY  Diverticulitis  Hypertension  High cholesterol  H/O colectomy  History of coronary artery bypass, five    ALLERGIES:  amiodarone (Unknown)  sulfa drugs (Unknown)    MEDICATIONS:  STANDING MEDICATIONS  clopidogrel Tablet 75 milliGRAM(s) Oral daily  DULoxetine 60 milliGRAM(s) Oral daily  enoxaparin Injectable 40 milliGRAM(s) SubCutaneous daily  hydrochlorothiazide 12.5 milliGRAM(s) Oral daily  niacin  milliGRAM(s) Oral at bedtime  ranolazine 500 milliGRAM(s) Oral two times a day  simvastatin 20 milliGRAM(s) Oral at bedtime    PRN MEDICATIONS  diazepam    Tablet 10 milliGRAM(s) Oral daily PRN  nitroglycerin     SubLingual 0.4 milliGRAM(s) SubLingual every 5 minutes PRN  traMADol 50 milliGRAM(s) Oral three times a day PRN      Home Medications  Home Medications:  Cymbalta 60 mg oral delayed release capsule: 1 cap(s) orally once a day (at bedtime) (11 Oct 2019 17:21)  diazePAM 10 mg oral tablet: 1 tab(s) orally once a day (11 Oct 2019 17:23)  hydroCHLOROthiazide 12.5 mg oral capsule: 1 cap(s) orally once a day (11 Oct 2019 17:22)  niacin 500 mg oral capsule, extended release: 1 cap(s) orally once a day (at bedtime) (11 Oct 2019 17:23)  Plavix 75 mg oral tablet: 1 tab(s) orally once a day (11 Oct 2019 13:45)  Ranexa 500 mg oral tablet, extended release: 1 tab(s) orally 2 times a day (11 Oct 2019 13:45)  simvastatin 20 mg oral tablet: 1 tab(s) orally once a day (at bedtime) (11 Oct 2019 17:22)      VITAL SIGNS: Last 24 Hours  T(C): 36.2 (27 Dec 2019 05:34), Max: 36.7 (26 Dec 2019 13:25)  T(F): 97.1 (27 Dec 2019 05:34), Max: 98 (26 Dec 2019 13:25)  HR: 53 (26 Dec 2019 20:00) (53 - 76)  BP: 149/68 (26 Dec 2019 20:00) (149/68 - 164/67)  BP(mean): --  RR: 18 (27 Dec 2019 05:34) (18 - 18)  SpO2: 97% (26 Dec 2019 20:10) (96% - 97%)    LABS:                        11.1   5.79  )-----------( 211      ( 27 Dec 2019 05:54 )             34.5     12-27    140  |  99  |  25<H>  ----------------------------<  122<H>  4.0   |  27  |  1.2    Ca    9.2      27 Dec 2019 05:54  Mg     2.2     12-27    TPro  6.8  /  Alb  4.4  /  TBili  0.4  /  DBili  x   /  AST  20  /  ALT  19  /  AlkPhos  101  12-27          Troponin T, Serum: <0.01 ng/mL (12-27-19 @ 05:54)  Creatine Kinase, Serum: 48 U/L (12-26-19 @ 20:01)  Troponin T, Serum: <0.01 ng/mL (12-26-19 @ 20:01)  Troponin T, Serum: <0.01 ng/mL (12-26-19 @ 15:51)      CARDIAC MARKERS ( 27 Dec 2019 05:54 )  x     / <0.01 ng/mL / x     / x     / x      CARDIAC MARKERS ( 26 Dec 2019 20:01 )  x     / <0.01 ng/mL / 48 U/L / x     / <1.0 ng/mL  CARDIAC MARKERS ( 26 Dec 2019 15:51 )  x     / <0.01 ng/mL / x     / x     / x          < from: Xray Chest 1 View AP/PA (12.26.19 @ 16:53) >    Impression:      No radiographic evidence of acute pulmonary disease.    < end of copied text >      GENERAL: NAD, well-developed, AAOx3  HEENT:  Atraumatic, Normocephalic. EOMI, PERRLA, conjunctiva and sclera clear, No JVD  PULMONARY: Clear to auscultation bilaterally; No wheeze  CARDIOVASCULAR: Regular rate and rhythm; No murmurs, rubs, or gallops  GASTROINTESTINAL: Soft, Nontender, Nondistended; Bowel sounds present  MUSCULOSKELETAL:  +1 edema in LE   NEUROLOGY: non-focal  SKIN: No rashes or lesions YOVANY DUNLAP 64y Male  MRN#: [id number]   CODE STATUS: full code     SUBJECTIVE  Patient is a 64y old Male who presents with a chief complaint of chest pain (26 Dec 2019 22:08)  Currently admitted to medicine with the primary diagnosis of Chest pain    Today is hospital day 1d, and this morning he is stable; he denies any major events overnight ; except for abdominal pain   he states his chest pain has improved       OBJECTIVE  PAST MEDICAL & SURGICAL HISTORY  Diverticulitis  Hypertension  High cholesterol  H/O colectomy  History of coronary artery bypass, five    ALLERGIES:  amiodarone (Unknown)  sulfa drugs (Unknown)    MEDICATIONS:  STANDING MEDICATIONS  clopidogrel Tablet 75 milliGRAM(s) Oral daily  DULoxetine 60 milliGRAM(s) Oral daily  enoxaparin Injectable 40 milliGRAM(s) SubCutaneous daily  hydrochlorothiazide 12.5 milliGRAM(s) Oral daily  niacin  milliGRAM(s) Oral at bedtime  ranolazine 500 milliGRAM(s) Oral two times a day  simvastatin 20 milliGRAM(s) Oral at bedtime    PRN MEDICATIONS  diazepam    Tablet 10 milliGRAM(s) Oral daily PRN  nitroglycerin     SubLingual 0.4 milliGRAM(s) SubLingual every 5 minutes PRN  traMADol 50 milliGRAM(s) Oral three times a day PRN      Home Medications  Home Medications:  Cymbalta 60 mg oral delayed release capsule: 1 cap(s) orally once a day (at bedtime) (11 Oct 2019 17:21)  diazePAM 10 mg oral tablet: 1 tab(s) orally once a day (11 Oct 2019 17:23)  hydroCHLOROthiazide 12.5 mg oral capsule: 1 cap(s) orally once a day (11 Oct 2019 17:22)  niacin 500 mg oral capsule, extended release: 1 cap(s) orally once a day (at bedtime) (11 Oct 2019 17:23)  Plavix 75 mg oral tablet: 1 tab(s) orally once a day (11 Oct 2019 13:45)  Ranexa 500 mg oral tablet, extended release: 1 tab(s) orally 2 times a day (11 Oct 2019 13:45)  simvastatin 20 mg oral tablet: 1 tab(s) orally once a day (at bedtime) (11 Oct 2019 17:22)      VITAL SIGNS: Last 24 Hours  T(C): 36.2 (27 Dec 2019 05:34), Max: 36.7 (26 Dec 2019 13:25)  T(F): 97.1 (27 Dec 2019 05:34), Max: 98 (26 Dec 2019 13:25)  HR: 53 (26 Dec 2019 20:00) (53 - 76)  BP: 149/68 (26 Dec 2019 20:00) (149/68 - 164/67)  BP(mean): --  RR: 18 (27 Dec 2019 05:34) (18 - 18)  SpO2: 97% (26 Dec 2019 20:10) (96% - 97%)    LABS:                        11.1   5.79  )-----------( 211      ( 27 Dec 2019 05:54 )             34.5     12-27    140  |  99  |  25<H>  ----------------------------<  122<H>  4.0   |  27  |  1.2    Ca    9.2      27 Dec 2019 05:54  Mg     2.2     12-27    TPro  6.8  /  Alb  4.4  /  TBili  0.4  /  DBili  x   /  AST  20  /  ALT  19  /  AlkPhos  101  12-27          Troponin T, Serum: <0.01 ng/mL (12-27-19 @ 05:54)  Creatine Kinase, Serum: 48 U/L (12-26-19 @ 20:01)  Troponin T, Serum: <0.01 ng/mL (12-26-19 @ 20:01)  Troponin T, Serum: <0.01 ng/mL (12-26-19 @ 15:51)      CARDIAC MARKERS ( 27 Dec 2019 05:54 )  x     / <0.01 ng/mL / x     / x     / x      CARDIAC MARKERS ( 26 Dec 2019 20:01 )  x     / <0.01 ng/mL / 48 U/L / x     / <1.0 ng/mL  CARDIAC MARKERS ( 26 Dec 2019 15:51 )  x     / <0.01 ng/mL / x     / x     / x          < from: Xray Chest 1 View AP/PA (12.26.19 @ 16:53) >    Impression:      No radiographic evidence of acute pulmonary disease.    < end of copied text >      GENERAL: NAD, well-developed, AAOx3  HEENT:  Atraumatic, Normocephalic. EOMI, PERRLA, conjunctiva and sclera clear, No JVD  PULMONARY: Clear to auscultation bilaterally; No wheeze  CARDIOVASCULAR: Regular rate and rhythm; No murmurs, rubs, or gallops  GASTROINTESTINAL: Soft, Nontender, Nondistended; Bowel sounds present  MUSCULOSKELETAL:  +1 edema in LE   NEUROLOGY: non-focal  SKIN: No rashes or lesions

## 2019-12-27 NOTE — CONSULT NOTE ADULT - ASSESSMENT
History  CAD CABG   DM CHOL  with   atypical  chest   pain      Lul  neg         plan  Lexiscan  nuclear  stress test

## 2019-12-28 ENCOUNTER — TRANSCRIPTION ENCOUNTER (OUTPATIENT)
Age: 64
End: 2019-12-28

## 2019-12-28 VITALS
HEART RATE: 59 BPM | SYSTOLIC BLOOD PRESSURE: 159 MMHG | DIASTOLIC BLOOD PRESSURE: 74 MMHG | TEMPERATURE: 98 F | RESPIRATION RATE: 20 BRPM

## 2019-12-28 LAB
ALBUMIN SERPL ELPH-MCNC: 4.1 G/DL — SIGNIFICANT CHANGE UP (ref 3.5–5.2)
ALP SERPL-CCNC: 104 U/L — SIGNIFICANT CHANGE UP (ref 30–115)
ALT FLD-CCNC: 18 U/L — SIGNIFICANT CHANGE UP (ref 0–41)
ANION GAP SERPL CALC-SCNC: 12 MMOL/L — SIGNIFICANT CHANGE UP (ref 7–14)
AST SERPL-CCNC: 17 U/L — SIGNIFICANT CHANGE UP (ref 0–41)
BASOPHILS # BLD AUTO: 0.01 K/UL — SIGNIFICANT CHANGE UP (ref 0–0.2)
BASOPHILS NFR BLD AUTO: 0.2 % — SIGNIFICANT CHANGE UP (ref 0–1)
BILIRUB SERPL-MCNC: 0.4 MG/DL — SIGNIFICANT CHANGE UP (ref 0.2–1.2)
BUN SERPL-MCNC: 23 MG/DL — HIGH (ref 10–20)
CALCIUM SERPL-MCNC: 9.2 MG/DL — SIGNIFICANT CHANGE UP (ref 8.5–10.1)
CHLORIDE SERPL-SCNC: 99 MMOL/L — SIGNIFICANT CHANGE UP (ref 98–110)
CO2 SERPL-SCNC: 27 MMOL/L — SIGNIFICANT CHANGE UP (ref 17–32)
CREAT SERPL-MCNC: 1.2 MG/DL — SIGNIFICANT CHANGE UP (ref 0.7–1.5)
EOSINOPHIL # BLD AUTO: 0.07 K/UL — SIGNIFICANT CHANGE UP (ref 0–0.7)
EOSINOPHIL NFR BLD AUTO: 1.2 % — SIGNIFICANT CHANGE UP (ref 0–8)
GLUCOSE SERPL-MCNC: 113 MG/DL — HIGH (ref 70–99)
HCT VFR BLD CALC: 34.2 % — LOW (ref 42–52)
HGB BLD-MCNC: 11.2 G/DL — LOW (ref 14–18)
IMM GRANULOCYTES NFR BLD AUTO: 0.7 % — HIGH (ref 0.1–0.3)
LYMPHOCYTES # BLD AUTO: 2.41 K/UL — SIGNIFICANT CHANGE UP (ref 1.2–3.4)
LYMPHOCYTES # BLD AUTO: 41.3 % — SIGNIFICANT CHANGE UP (ref 20.5–51.1)
MAGNESIUM SERPL-MCNC: 2.2 MG/DL — SIGNIFICANT CHANGE UP (ref 1.8–2.4)
MCHC RBC-ENTMCNC: 28.9 PG — SIGNIFICANT CHANGE UP (ref 27–31)
MCHC RBC-ENTMCNC: 32.7 G/DL — SIGNIFICANT CHANGE UP (ref 32–37)
MCV RBC AUTO: 88.1 FL — SIGNIFICANT CHANGE UP (ref 80–94)
MONOCYTES # BLD AUTO: 0.66 K/UL — HIGH (ref 0.1–0.6)
MONOCYTES NFR BLD AUTO: 11.3 % — HIGH (ref 1.7–9.3)
NEUTROPHILS # BLD AUTO: 2.65 K/UL — SIGNIFICANT CHANGE UP (ref 1.4–6.5)
NEUTROPHILS NFR BLD AUTO: 45.3 % — SIGNIFICANT CHANGE UP (ref 42.2–75.2)
NRBC # BLD: 0 /100 WBCS — SIGNIFICANT CHANGE UP (ref 0–0)
PLATELET # BLD AUTO: 207 K/UL — SIGNIFICANT CHANGE UP (ref 130–400)
POTASSIUM SERPL-MCNC: 4.1 MMOL/L — SIGNIFICANT CHANGE UP (ref 3.5–5)
POTASSIUM SERPL-SCNC: 4.1 MMOL/L — SIGNIFICANT CHANGE UP (ref 3.5–5)
PROT SERPL-MCNC: 6.8 G/DL — SIGNIFICANT CHANGE UP (ref 6–8)
RBC # BLD: 3.88 M/UL — LOW (ref 4.7–6.1)
RBC # FLD: 13.4 % — SIGNIFICANT CHANGE UP (ref 11.5–14.5)
SODIUM SERPL-SCNC: 138 MMOL/L — SIGNIFICANT CHANGE UP (ref 135–146)
WBC # BLD: 5.84 K/UL — SIGNIFICANT CHANGE UP (ref 4.8–10.8)
WBC # FLD AUTO: 5.84 K/UL — SIGNIFICANT CHANGE UP (ref 4.8–10.8)

## 2019-12-28 PROCEDURE — 76705 ECHO EXAM OF ABDOMEN: CPT | Mod: 26

## 2019-12-28 RX ORDER — DULOXETINE HYDROCHLORIDE 30 MG/1
1 CAPSULE, DELAYED RELEASE ORAL
Qty: 0 | Refills: 0 | DISCHARGE
Start: 2019-12-28

## 2019-12-28 RX ORDER — KETOROLAC TROMETHAMINE 30 MG/ML
30 SYRINGE (ML) INJECTION ONCE
Refills: 0 | Status: DISCONTINUED | OUTPATIENT
Start: 2019-12-28 | End: 2019-12-28

## 2019-12-28 RX ORDER — FAMOTIDINE 10 MG/ML
20 INJECTION INTRAVENOUS DAILY
Refills: 0 | Status: DISCONTINUED | OUTPATIENT
Start: 2019-12-28 | End: 2019-12-28

## 2019-12-28 RX ORDER — LANOLIN ALCOHOL/MO/W.PET/CERES
5 CREAM (GRAM) TOPICAL ONCE
Refills: 0 | Status: COMPLETED | OUTPATIENT
Start: 2019-12-28 | End: 2019-12-28

## 2019-12-28 RX ORDER — ONDANSETRON 8 MG/1
4 TABLET, FILM COATED ORAL EVERY 8 HOURS
Refills: 0 | Status: DISCONTINUED | OUTPATIENT
Start: 2019-12-28 | End: 2019-12-28

## 2019-12-28 RX ORDER — SENNA PLUS 8.6 MG/1
2 TABLET ORAL
Qty: 0 | Refills: 0 | DISCHARGE
Start: 2019-12-28

## 2019-12-28 RX ORDER — KETOROLAC TROMETHAMINE 30 MG/ML
15 SYRINGE (ML) INJECTION ONCE
Refills: 0 | Status: DISCONTINUED | OUTPATIENT
Start: 2019-12-28 | End: 2019-12-28

## 2019-12-28 RX ORDER — LACTULOSE 10 G/15ML
15 SOLUTION ORAL
Qty: 0 | Refills: 0 | DISCHARGE
Start: 2019-12-28

## 2019-12-28 RX ORDER — FAMOTIDINE 10 MG/ML
1 INJECTION INTRAVENOUS
Qty: 30 | Refills: 0
Start: 2019-12-28 | End: 2020-01-26

## 2019-12-28 RX ORDER — DULOXETINE HYDROCHLORIDE 30 MG/1
1 CAPSULE, DELAYED RELEASE ORAL
Qty: 0 | Refills: 0 | DISCHARGE

## 2019-12-28 RX ORDER — SENNA PLUS 8.6 MG/1
2 TABLET ORAL AT BEDTIME
Refills: 0 | Status: DISCONTINUED | OUTPATIENT
Start: 2019-12-28 | End: 2019-12-28

## 2019-12-28 RX ORDER — LACTULOSE 10 G/15ML
10 SOLUTION ORAL THREE TIMES A DAY
Refills: 0 | Status: DISCONTINUED | OUTPATIENT
Start: 2019-12-28 | End: 2019-12-28

## 2019-12-28 RX ADMIN — ENOXAPARIN SODIUM 40 MILLIGRAM(S): 100 INJECTION SUBCUTANEOUS at 12:02

## 2019-12-28 RX ADMIN — CLOPIDOGREL BISULFATE 75 MILLIGRAM(S): 75 TABLET, FILM COATED ORAL at 12:03

## 2019-12-28 RX ADMIN — FAMOTIDINE 20 MILLIGRAM(S): 10 INJECTION INTRAVENOUS at 14:06

## 2019-12-28 RX ADMIN — RANOLAZINE 500 MILLIGRAM(S): 500 TABLET, FILM COATED, EXTENDED RELEASE ORAL at 06:15

## 2019-12-28 RX ADMIN — Medication 5 MILLIGRAM(S): at 00:58

## 2019-12-28 RX ADMIN — TRAMADOL HYDROCHLORIDE 50 MILLIGRAM(S): 50 TABLET ORAL at 08:46

## 2019-12-28 RX ADMIN — Medication 12.5 MILLIGRAM(S): at 06:15

## 2019-12-28 RX ADMIN — Medication 15 MILLIGRAM(S): at 12:00

## 2019-12-28 RX ADMIN — Medication 30 MILLIGRAM(S): at 00:57

## 2019-12-28 RX ADMIN — DULOXETINE HYDROCHLORIDE 60 MILLIGRAM(S): 30 CAPSULE, DELAYED RELEASE ORAL at 12:03

## 2019-12-28 NOTE — PROGRESS NOTE ADULT - ASSESSMENT
63y M w/ PMH of diverticulitis with partial colectomy (2 feet) in 1998, CAD s/p PCI to the LAD then quadruple bypass complicated by staph infection of the wound requiring IV antibiotics for 3 months, had another wound infection 2 years ago that needed drainage I/V ABX for 3 months, HTN, and HLD presents with chest pain.     # Atypical chest pain with hx of stable angina, CAD s/p PCI and CABG with hx of infected wound-- doubt cardiac   - no acute EKG changes, CE -ve X 3- lexiscan is normal   - c/w plavix, statins, Ranexa 500mg BID, not on BB due to sinus bradycardia  - LDL 79, HbA1C 6.1  - Tylenol and tramadol for pain.  - echo reviewed- Dr Horner on case- fu outpatient     #abd pain non specific   hx of gallstone on 12/07  low suspicion for cholecystitis however plan for HIDA scan and US RUQ as per attending   spoke to US tech to get the US done said for now she is busy - so asked twice to expedite the study  -patient passing flatus      # hx of Dizziness and syncope  - c/w tele, negative orthostatic, VS, 2 D echo  - pt is on valium, can be contributing to bradycardia and dizziness  - plan for tilt table test      # HTN  - c/w HCTZ 12.5mg  - c/w monitoring    # Anxiety  - Vallium prn    DVT PPx: Lovenox  GI PPX: not indicated  Diet: DASH/Carb consistent  Activity: Increase as tolerated  Dispo: from home, no needs  Code Status: full code

## 2019-12-28 NOTE — DISCHARGE NOTE PROVIDER - NSDCMRMEDTOKEN_GEN_ALL_CORE_FT
diazePAM 10 mg oral tablet: 1 tab(s) orally once a day  DULoxetine 60 mg oral delayed release capsule: 1 cap(s) orally once a day  famotidine 20 mg oral tablet: 1 tab(s) orally once a day  hydroCHLOROthiazide 12.5 mg oral capsule: 1 cap(s) orally once a day  lactulose 10 g/15 mL oral syrup: 15 milliliter(s) orally 3 times a day, As Needed - for constipation  niacin 500 mg oral capsule, extended release: 1 cap(s) orally once a day (at bedtime)  Plavix 75 mg oral tablet: 1 tab(s) orally once a day  Ranexa 500 mg oral tablet, extended release: 1 tab(s) orally 2 times a day  senna oral tablet: 2 tab(s) orally once a day (at bedtime)  simvastatin 20 mg oral tablet: 1 tab(s) orally once a day (at bedtime)

## 2019-12-28 NOTE — PROGRESS NOTE ADULT - ASSESSMENT
63y M w/ PMH of diverticulitis with partial colectomy (2 feet) in 1998, CAD s/p PCI to the LAD then quadruple bypass complicated by staph infection of the wound requiring IV antibiotics for 3 months, had another wound infection 2 years ago that needed drainage I/V ABX for 3 months, HTN, and HLD presents with chest pain.     # Atypical chest pain with hx of stable angina, CAD s/p PCI and CABG with hx of infected wound-- doubt cardiac   - no acute EKG changes, CE -ve X 3- lexiscan is normal   - c/w plavix, statins, Ranexa 500mg BID, not on BB due to sinus bradycardia  - LDL 79, HbA1C 6.1  - Tylenol and tramadol for pain.  - echo reviewed- Dr Horner on case- fu outpatient     #abd pain non specific   hx of gallstone on 12/07  low suspicion for cholecystitis however plan for HIDA scan and US RUQ as per attending   spoke to US tech to get the US done said for now she is busy - so asked twice to expedite the study  -patient passing flatus      # hx of Dizziness and syncope  - c/w tele, negative orthostatic, VS, 2 D echo  - pt is on valium, can be contributing to bradycardia and dizziness  - plan for tilt table test      # HTN  - c/w HCTZ 12.5mg  - c/w monitoring    # Anxiety  - Vallium prn    DVT PPx: Lovenox  GI PPX: not indicated  Diet: DASH/Carb consistent  Activity: Increase as tolerated  Dispo: from home, no needs  Code Status: full code 63y M w/ PMH of diverticulitis with partial colectomy (2 feet) in 1998, CAD s/p PCI to the LAD then quadruple bypass complicated by staph infection of the wound requiring IV antibiotics for 3 months, had another wound infection 2 years ago that needed drainage I/V ABX for 3 months, HTN, and HLD presents with chest pain.     # Atypical chest pain with hx of stable angina, CAD s/p PCI and CABG with hx of infected wound-- doubt cardiac       And cardiac felt unlikely to be acute coronary syndrome but agreed with stress, was done, and was negative.  - no acute EKG changes, CE -ve X 3- lexiscan is normal   - c/w plavix, statins, Ranexa 500mg BID, not on BB due to sinus bradycardia  - LDL 79, HbA1C 6.1  - Tylenol and tramadol for pain.-Advised patient to try and avoid opioids light of ongoing abdominal pain issues  - echo reviewed- Dr Horner on case- fu outpatient --Reviewed with Dr. Aguirre in the ED area    #abd pain non specific   hx of gallstone on 12/07  low suspicion for cholecystitis - Reviewed ultrasound with radiology resident-       Positive small gallstones but no signs of any inflammation with normal gallbladder wall and no fluid.  Normal CBD.  -patient passing flatus, And eating and had no bowel movement here over his 48 hours.  He states that it is difficult for him to be comfortable in a public restroom.  Will start famotidine therapy as antacid, advised patient to take milk of magnesia or Tums.  Gave patient prescription for H. pylori antigen test of the stool.  And will see patient this week in our office and referred him to gastroenterology.      # hx of Dizziness and syncope  - c/w tele, negative orthostatic, VS, 2 D echo  - pt is on valium, can be contributing to bradycardia and dizziness  - plan for tilt table test- We will follow up with EP as OP.      # HTN  - c/w HCTZ 12.5mg  - c/w monitoring    # Anxiety  - Vallium prn    DVT PPx: Lovenox  GI PPX: not indicated  Diet: DASH/Carb consistent  Activity: Increase as tolerated  Dispo: Clear for DC today- we will give famotidine; advised to avoid opioids, use Tylenol for pain; and I will see patient within the week.  Code Status: full code

## 2019-12-28 NOTE — DISCHARGE NOTE NURSING/CASE MANAGEMENT/SOCIAL WORK - NSDCPEWEB_GEN_ALL_CORE
[place] website --- [email or web address] Tobacco [email or web address] website --- [email or web address]

## 2019-12-28 NOTE — DISCHARGE NOTE NURSING/CASE MANAGEMENT/SOCIAL WORK - PATIENT PORTAL LINK FT
You can access the FollowMyHealth Patient Portal offered by [facility] by registering at the following website: [email or web address]. By joining [employer]’s FollowMyHealth portal, you will also be able to view your health information using other applications (apps) compatible with our system.

## 2019-12-28 NOTE — PROGRESS NOTE ADULT - SUBJECTIVE AND OBJECTIVE BOX
YOVANY DUNLAP 64y Male  MRN#: [id number]   CODE STATUS: full code      SUBJECTIVE  Patient is a 64y old Male who presents with a chief complaint of chest pain (27 Dec 2019 18:50)  Currently admitted to medicine with the primary diagnosis of Chest pain    Today is hospital day 2d, and this morning he is stable; he is still complaining of diffuse abdominal pain; but no nausea or vomit     OBJECTIVE  PAST MEDICAL & SURGICAL HISTORY  Diverticulitis  Hypertension  High cholesterol  H/O colectomy  History of coronary artery bypass, five    ALLERGIES:  amiodarone (Unknown)  sulfa drugs (Unknown)    MEDICATIONS:  STANDING MEDICATIONS  clopidogrel Tablet 75 milliGRAM(s) Oral daily  DULoxetine 60 milliGRAM(s) Oral daily  enoxaparin Injectable 40 milliGRAM(s) SubCutaneous daily  hydrochlorothiazide 12.5 milliGRAM(s) Oral daily  lactulose Syrup 10 Gram(s) Oral three times a day  melatonin 5 milliGRAM(s) Oral at bedtime  niacin  milliGRAM(s) Oral at bedtime  ranolazine 500 milliGRAM(s) Oral two times a day  senna 2 Tablet(s) Oral at bedtime  simvastatin 20 milliGRAM(s) Oral at bedtime    PRN MEDICATIONS  aluminum hydroxide/magnesium hydroxide/simethicone Suspension 30 milliLiter(s) Oral every 6 hours PRN  diazepam    Tablet 10 milliGRAM(s) Oral daily PRN  nitroglycerin     SubLingual 0.4 milliGRAM(s) SubLingual every 5 minutes PRN  ondansetron    Tablet 4 milliGRAM(s) Oral every 8 hours PRN  traMADol 50 milliGRAM(s) Oral three times a day PRN      Home Medications  Home Medications:  Cymbalta 60 mg oral delayed release capsule: 1 cap(s) orally once a day (at bedtime) (11 Oct 2019 17:21)  diazePAM 10 mg oral tablet: 1 tab(s) orally once a day (11 Oct 2019 17:23)  hydroCHLOROthiazide 12.5 mg oral capsule: 1 cap(s) orally once a day (11 Oct 2019 17:22)  niacin 500 mg oral capsule, extended release: 1 cap(s) orally once a day (at bedtime) (11 Oct 2019 17:23)  Plavix 75 mg oral tablet: 1 tab(s) orally once a day (11 Oct 2019 13:45)  Ranexa 500 mg oral tablet, extended release: 1 tab(s) orally 2 times a day (11 Oct 2019 13:45)  simvastatin 20 mg oral tablet: 1 tab(s) orally once a day (at bedtime) (11 Oct 2019 17:22)      VITAL SIGNS: Last 24 Hours  T(C): 36.3 (28 Dec 2019 05:34), Max: 36.3 (28 Dec 2019 05:34)  T(F): 97.4 (28 Dec 2019 05:34), Max: 97.4 (28 Dec 2019 05:34)  HR: 52 (28 Dec 2019 05:34) (52 - 62)  BP: 127/63 (28 Dec 2019 05:34) (127/63 - 142/68)  BP(mean): --  RR: 18 (28 Dec 2019 05:34) (18 - 18)  SpO2: --    LABS:                        11.2   5.84  )-----------( 207      ( 28 Dec 2019 07:11 )             34.2     12-28    138  |  99  |  23<H>  ----------------------------<  113<H>  4.1   |  27  |  1.2    Ca    9.2      28 Dec 2019 07:11  Mg     2.2     12-28    TPro  6.8  /  Alb  4.1  /  TBili  0.4  /  DBili  x   /  AST  17  /  ALT  18  /  AlkPhos  104  12-28              CARDIAC MARKERS ( 27 Dec 2019 05:54 )  x     / <0.01 ng/mL / x     / x     / x      CARDIAC MARKERS ( 26 Dec 2019 20:01 )  x     / <0.01 ng/mL / 48 U/L / x     / <1.0 ng/mL  CARDIAC MARKERS ( 26 Dec 2019 15:51 )  x     / <0.01 ng/mL / x     / x     / x          RADIOLOGY:  < from: NM Nuclear Stress Pharmacologic Multiple (12.27.19 @ 12:40) >  1. NORMAL LEXISCAN / REST MYOCARDIAL PERFUSION SPECT TOMOGRAPHY, WITH NO EVIDENCE FOR ISCHEMIA DURING LEXISCAN INFUSION.   2. NORMAL RESTING LEFT VENTRICULAR WALL MOTION AND WALL THICKENING.  3. LEFT VENTRICULAREJECTION FRACTION OF  69 % WHICH IS WITHIN RANGE OF NORMAL.     < end of copied text >    PHYSICAL EXAM:    GENERAL: NAD, well-developed, AAOx3  HEENT:  Atraumatic, Normocephalic. EOMI, PERRLA, conjunctiva and sclera clear, No JVD  PULMONARY: Clear to auscultation bilaterally; No wheeze  CARDIOVASCULAR: Regular rate and rhythm; No murmurs, rubs, or gallops  GASTROINTESTINAL: Soft, distended and diffuse tenderness   MUSCULOSKELETAL:  2+ Peripheral Pulses, No clubbing, cyanosis, or edema  NEUROLOGY: non-focal  SKIN: No rashes or lesions

## 2019-12-28 NOTE — DISCHARGE NOTE PROVIDER - HOSPITAL COURSE
63y M w/ PMH of diverticulitis with partial colectomy (2 feet) in 1998, CAD s/p PCI to the LAD then quadruple bypass complicated by staph infection of the wound requiring IV antibiotics for 3 months, had another wound infection 2 years ago that needed drainage I/V ABX for 3 months, HTN, and HLD presents with chest pain; lexiscan was negative - patient evaluated by cardiology ; no need for further work up '    patient has also abdominal pain ; non specific US gallbladder showed gallstones only     patient will follow up outptint with Dr Gregg patient passing flatus, And eating and had no bowel movement here over his 48 hours.  He states that it is difficult for him to be comfortable in a public restroom.  Will start famotidine therapy as antacid, advised patient to take milk of magnesia or Tums.  Gave patient prescription for H. pylori antigen test of the stool.  And will see patient this week in our office and referred him to gastroenterology.

## 2019-12-28 NOTE — PROGRESS NOTE ADULT - SUBJECTIVE AND OBJECTIVE BOX
YOVANY DUNLAP 64y Male  MRN#: [id number]   CODE STATUS: full code  Chart reviewed, patient examined. Pertinent results reviewed.  Case discussed with HO; specialist f/u reviewed  HD#2;       SUBJECTIVE  Patient is a 64y old Male who presented with a chief complaint of chest pain (27 Dec 2019 18:50)  Currently admitted to medicine with the primary diagnosis of Chest pain- R/O ACS in pt w known CAD & severe anxiety    Today is hospital day 2d, and this morning he is stable; he is still complaining of diffuse abdominal pain; but no nausea or vomit; He had his limited abd US, and then ate breakfast.     OBJECTIVE  PAST MEDICAL & SURGICAL HISTORY  Diverticulitis  Hypertension  High cholesterol  H/O colectomy  History of coronary artery bypass, five    ALLERGIES:  amiodarone (Unknown)  sulfa drugs (Unknown)    MEDICATIONS:  MEDICATIONS  (STANDING):  clopidogrel Tablet 75 milliGRAM(s) Oral daily  DULoxetine 60 milliGRAM(s) Oral daily  enoxaparin Injectable 40 milliGRAM(s) SubCutaneous daily  famotidine    Tablet 20 milliGRAM(s) Oral daily  hydrochlorothiazide 12.5 milliGRAM(s) Oral daily  lactulose Syrup 10 Gram(s) Oral three times a day  melatonin 5 milliGRAM(s) Oral at bedtime  niacin  milliGRAM(s) Oral at bedtime  ranolazine 500 milliGRAM(s) Oral two times a day  senna 2 Tablet(s) Oral at bedtime  simvastatin 20 milliGRAM(s) Oral at bedtime    MEDICATIONS  (PRN):  aluminum hydroxide/magnesium hydroxide/simethicone Suspension 30 milliLiter(s) Oral every 6 hours PRN Dyspepsia  diazepam    Tablet 10 milliGRAM(s) Oral daily PRN anxiety  nitroglycerin     SubLingual 0.4 milliGRAM(s) SubLingual every 5 minutes PRN Chest Pain  ondansetron    Tablet 4 milliGRAM(s) Oral every 8 hours PRN Nausea and/or Vomiting  traMADol 50 milliGRAM(s) Oral three times a day PRN Moderate Pain (4 - 6)    Home Medications  Home Medications:  Cymbalta 60 mg oral delayed release capsule: 1 cap(s) orally once a day (at bedtime) (11 Oct 2019 17:21)  diazePAM 10 mg oral tablet: 1 tab(s) orally once a day (11 Oct 2019 17:23)  hydroCHLOROthiazide 12.5 mg oral capsule: 1 cap(s) orally once a day (11 Oct 2019 17:22)  niacin 500 mg oral capsule, extended release: 1 cap(s) orally once a day (at bedtime) (11 Oct 2019 17:23)  Plavix 75 mg oral tablet: 1 tab(s) orally once a day (11 Oct 2019 13:45)  Ranexa 500 mg oral tablet, extended release: 1 tab(s) orally 2 times a day (11 Oct 2019 13:45)  simvastatin 20 mg oral tablet: 1 tab(s) orally once a day (at bedtime) (11 Oct 2019 17:22)      VITAL SIGNS: Last 24 Hours  Vital Signs Last 24 Hrs  T(C): 36.3 (28 Dec 2019 05:34), Max: 36.3 (28 Dec 2019 05:34)  T(F): 97.4 (28 Dec 2019 05:34), Max: 97.4 (28 Dec 2019 05:34)  HR: 52 (28 Dec 2019 05:34) (52 - 62)  BP: 127/63 (28 Dec 2019 05:34) (127/63 - 142/68)  BP(mean): --  RR: 18 (28 Dec 2019 05:34) (18 - 18)  SpO2: --    LABS:                        11.2   5.84  )-----------( 207      ( 28 Dec 2019 07:11 )             34.2     12-28    138  |  99  |  23<H>  ----------------------------<  113<H>  4.1   |  27  |  1.2    Ca    9.2      28 Dec 2019 07:11  Mg     2.2     12-28    TPro  6.8  /  Alb  4.1  /  TBili  0.4  /  DBili  x   /  AST  17  /  ALT  18  /  AlkPhos  104  12-28              CARDIAC MARKERS ( 27 Dec 2019 05:54 )  x     / <0.01 ng/mL / x     / x     / x      CARDIAC MARKERS ( 26 Dec 2019 20:01 )  x     / <0.01 ng/mL / 48 U/L / x     / <1.0 ng/mL  CARDIAC MARKERS ( 26 Dec 2019 15:51 )  x     / <0.01 ng/mL / x     / x     / x          RADIOLOGY:  < from: NM Nuclear Stress Pharmacologic Multiple (12.27.19 @ 12:40) >  1. NORMAL LEXISCAN / REST MYOCARDIAL PERFUSION SPECT TOMOGRAPHY, WITH NO EVIDENCE FOR ISCHEMIA DURING LEXISCAN INFUSION.   2. NORMAL RESTING LEFT VENTRICULAR WALL MOTION AND WALL THICKENING.  3. LEFT VENTRICULAREJECTION FRACTION OF  69 % WHICH IS WITHIN RANGE OF NORMAL.     < end of copied text >    PHYSICAL EXAM:    GENERAL: NAD, well-developed, AAOx4;   HEENT:  Atraumatic, Normocephalic. EOMI, PERRLA, conjunctiva and sclera clear, No JVD  PULMONARY: Clear to auscultation bilaterally; No wheeze  CARDIOVASCULAR: Regular rate and rhythm; No murmurs, rubs, or gallops  GASTROINTESTINAL: Soft, distended and diffuse tenderness   MUSCULOSKELETAL:  2+ Peripheral Pulses, No clubbing, cyanosis, or edema  NEUROLOGY: non-focal  SKIN: No rashes or lesions YOVANY DUNLAP 64y Male  MRN#: [id number]   CODE STATUS: full code  Chart reviewed, patient examined. Pertinent results reviewed.  Case discussed with HO; specialist f/u reviewed  HD#2; Patient had his adenosine nuclear stress test-which was negative for ischemia.      SUBJECTIVE  Patient is a 64y old Male who presented with a chief complaint of chest pain (27 Dec 2019 18:50)  Currently admitted to medicine with the primary diagnosis of Chest pain- R/O ACS in pt w known CAD & severe anxiety    Today is hospital day 2d, and this morning he is stable; he is still complaining of diffuse abdominal pain; but no nausea or vomit; He had his limited abd US, and then ate breakfast. He says his abdominal pain is 10 out of 10 but he ambulates easily, gets in and out of bed, and each his breakfast..  Subjective pain greater than objective signs.  Patient is well known to me and has chronic complaints of pain for many years.  He has had opioid dependence in the past but has not been taking any recently.  The recent CT from early December was reviewed.  He had his limited abdominal ultrasound today and I will discuss with radiology.    OBJECTIVE  PAST MEDICAL & SURGICAL HISTORY  Diverticulitis  Hypertension  High cholesterol  H/O colectomy  History of coronary artery bypass, five    ALLERGIES:  amiodarone (Unknown)  sulfa drugs (Unknown)    MEDICATIONS:  MEDICATIONS  (STANDING):  clopidogrel Tablet 75 milliGRAM(s) Oral daily  DULoxetine 60 milliGRAM(s) Oral daily  enoxaparin Injectable 40 milliGRAM(s) SubCutaneous daily  famotidine    Tablet 20 milliGRAM(s) Oral daily  hydrochlorothiazide 12.5 milliGRAM(s) Oral daily  lactulose Syrup 10 Gram(s) Oral three times a day  melatonin 5 milliGRAM(s) Oral at bedtime  niacin  milliGRAM(s) Oral at bedtime  ranolazine 500 milliGRAM(s) Oral two times a day  senna 2 Tablet(s) Oral at bedtime  simvastatin 20 milliGRAM(s) Oral at bedtime    MEDICATIONS  (PRN):  aluminum hydroxide/magnesium hydroxide/simethicone Suspension 30 milliLiter(s) Oral every 6 hours PRN Dyspepsia  diazepam    Tablet 10 milliGRAM(s) Oral daily PRN anxiety  nitroglycerin     SubLingual 0.4 milliGRAM(s) SubLingual every 5 minutes PRN Chest Pain  ondansetron    Tablet 4 milliGRAM(s) Oral every 8 hours PRN Nausea and/or Vomiting  traMADol 50 milliGRAM(s) Oral three times a day PRN Moderate Pain (4 - 6)    Home Medications  Home Medications:  Cymbalta 60 mg oral delayed release capsule: 1 cap(s) orally once a day (at bedtime) (11 Oct 2019 17:21)  diazePAM 10 mg oral tablet: 1 tab(s) orally once a day (11 Oct 2019 17:23)  hydroCHLOROthiazide 12.5 mg oral capsule: 1 cap(s) orally once a day (11 Oct 2019 17:22)  niacin 500 mg oral capsule, extended release: 1 cap(s) orally once a day (at bedtime) (11 Oct 2019 17:23)  Plavix 75 mg oral tablet: 1 tab(s) orally once a day (11 Oct 2019 13:45)  Ranexa 500 mg oral tablet, extended release: 1 tab(s) orally 2 times a day (11 Oct 2019 13:45)  simvastatin 20 mg oral tablet: 1 tab(s) orally once a day (at bedtime) (11 Oct 2019 17:22)      VITAL SIGNS: Last 24 Hours  Vital Signs Last 24 Hrs  T(C): 36.3 (28 Dec 2019 05:34), Max: 36.3 (28 Dec 2019 05:34)  T(F): 97.4 (28 Dec 2019 05:34), Max: 97.4 (28 Dec 2019 05:34)  HR: 52 (28 Dec 2019 05:34) (52 - 62)  BP: 127/63 (28 Dec 2019 05:34) (127/63 - 142/68)  BP(mean): --  RR: 18 (28 Dec 2019 05:34) (18 - 18)  SpO2: --    LABS:                        11.2   5.84  )-----------( 207      ( 28 Dec 2019 07:11 )             34.2     12-28    138  |  99  |  23<H>  ----------------------------<  113<H>  4.1   |  27  |  1.2    Ca    9.2      28 Dec 2019 07:11  Mg     2.2     12-28    TPro  6.8  /  Alb  4.1  /  TBili  0.4  /  DBili  x   /  AST  17  /  ALT  18  /  AlkPhos  104  12-28              CARDIAC MARKERS ( 27 Dec 2019 05:54 )  x     / <0.01 ng/mL / x     / x     / x      CARDIAC MARKERS ( 26 Dec 2019 20:01 )  x     / <0.01 ng/mL / 48 U/L / x     / <1.0 ng/mL  CARDIAC MARKERS ( 26 Dec 2019 15:51 )  x     / <0.01 ng/mL / x     / x     / x          RADIOLOGY:  < from: NM Nuclear Stress Pharmacologic Multiple (12.27.19 @ 12:40) >  1. NORMAL LEXISCAN / REST MYOCARDIAL PERFUSION SPECT TOMOGRAPHY, WITH NO EVIDENCE FOR ISCHEMIA DURING LEXISCAN INFUSION.   2. NORMAL RESTING LEFT VENTRICULAR WALL MOTION AND WALL THICKENING.  3. LEFT VENTRICULAREJECTION FRACTION OF  69 % WHICH IS WITHIN RANGE OF NORMAL.     < end of copied text >    PHYSICAL EXAM:    GENERAL: NAD, well-developed, AAOx4; Anxious and hyperverbal   HEENT:  AT/NC; EOMI, PERRLA, conjunctiva and sclera clear, There were a 3/4.  PULMONARY: Clear bilaterally; No wheeze  CARDIOVASCULAR: Sternotomy scar and nontender chest wall; RRR; No murmurs, rubs, or gallops  GASTROINTESTINAL: Obese with midline scar; bowel sounds positive; soft, slightly tender in epigastric, right upper quadrant, and right hypogastric areas.  No M, HSM.  No R, G.  MUSCULOSKELETAL:  2+ Peripheral Pulses, No clubbing, cyanosis, Tr+ edema and scars In the LE from venous harvesting  NEUROLOGY: non-focal  SKIN: No rashes or lesions

## 2019-12-28 NOTE — DISCHARGE NOTE NURSING/CASE MANAGEMENT/SOCIAL WORK - NSDCPEEMAIL_GEN_ALL_CORE
[facility] for Tobacco Control email [email or web address]

## 2019-12-28 NOTE — DISCHARGE NOTE PROVIDER - CARE PROVIDER_API CALL
Harry Gregg)  Hematology; Internal Medicine; Medical Oncology  [address] [place] [address]  Phone: [phone]  Fax: [phone]  Follow Up Time:     Tyler Aguirre)  Cardiovascular Disease  [address] [place] [address]  Phone: [phone]  Fax: [phone]  Follow Up Time:

## 2019-12-28 NOTE — DISCHARGE NOTE PROVIDER - NSDCFUSCHEDAPPT_GEN_ALL_CORE_FT
YOVANY DUNLAP ; 01/03/2020 ; [facility]  YOVANY DUNLAP ; 01/31/2020 ; [facility]

## 2019-12-28 NOTE — DISCHARGE NOTE PROVIDER - NSDCCPCAREPLAN_GEN_ALL_CORE_FT
PRINCIPAL DISCHARGE DIAGNOSIS  Diagnosis: Chest pain  Assessment and Plan of Treatment: you had a stress test that was negative   Dr Aguirre saw you and said no need for further cardiac test   you can follow up outpatient as no active cardiac issues      SECONDARY DISCHARGE DIAGNOSES  Diagnosis: Gallstone  Assessment and Plan of Treatment: you have gallstones on US but no concern fopr cholecystis   Will start famotidine therapy as antacid, campbell are advised to take milk of magnesia or Tums.  Gave patient prescription for H. pylori antigen test of the stool to check .  And see Dr Holbrook in office and referred him to gastroenterology.

## 2019-12-28 NOTE — DISCHARGE NOTE PROVIDER - CARE PROVIDERS DIRECT ADDRESSES
----- Message from Jes Fernandez sent at 5/4/2018  4:06 PM CDT -----  Contact: Milagros Cantu called about medication and states that they need to speak with a nurse   [phone]  Thank You  KEANU Fernandez  
Message forwarded to Nurse Alesia Petersen.  Divya  
[email or web address]

## 2019-12-31 NOTE — H&P ADULT - HISTORY OF PRESENT ILLNESS
Thank you for visiting the [facility], [place]. Interested in decreasing your wait time in the [address]? Same day reservations can now be made on line. Go to [email or web address] to see the wait times at each [facility] and to make a reservation at the site that is most convenient for you. We will do our best to honor your reservation time, but please understand that wait times are subject to change once you arrive at the clinic. It is difficult to recognize all elements of any illness or injury in a single visit. The examination, treatment, and x-rays received are on a preliminary basis only. A radiologist will also review your x-rays for final reading and you will be notified if the final reading is different than the preliminary reading. Call your primary care provider if you have questions or problems before your next appointment. If symptoms worsen or do not resolve please follow-up with your Primary Care Provider (PCP), Urgent Care or the nearest  Emergency Room for emergency symptoms. If you are unsure of whom to follow up with, call [phone] and ask to speak with either your PCP, the Urgent Care Nurse or the on-call provider if you are calling after hours. If you are referred to a specialist or scheduled for a test, our referrals department will call you with your appointment date and time within 3 business days. If you have not heard from them in this time frame, please call [phone] and ask for the referrals department. Test results: Unless otherwise instructed, you should be notified of test results within one week. Please call our office if you do not hear from us within this time frame at [phone]. Hours:   Sunday-Saturday:  8:00 am to 8:00 pm  Holidays: 9:00 am to 5:00 pm  Closed: [place] Day    Help us to grow our quality of service! We want to improve - and you can help!   You may receive a survey in the mail or via e-mail. This is your opportunity to tell us what excellent service you received, and where we could use improvement. We value your input! Thank you again for visiting [facility], [facility]. Leroy Goldberg NP      Patient Education   Patient Education     Viral Upper Respiratory Illness (Adult)  You have a viral upper respiratory illness (URI), which is another term for the common cold. This illness is contagious during the first few days. It is spread through the air by coughing and sneezing. It may also be spread by direct contact (touching the sick person and then touching your own eyes, nose, or mouth). Frequent handwashing will decrease risk of spread. Most viral illnesses go away within 7 to 10 days with rest and simple home remedies. Sometimes the illness may last for several weeks. Antibiotics will not kill a virus, and they are generally not prescribed for this condition. Home care  Â· If symptoms are severe, rest at home for the first 2 to 3 days. When you resume activity, don't let yourself get too tired. Â· Avoid being exposed to cigarette smoke (yours or othersâ). Â· You may use acetaminophen or ibuprofen to control pain and fever, unless another medicine was prescribed. Â If you have chronic liver or kidney disease, have ever had a stomach ulcer or gastrointestinal bleeding, or are taking blood-thinning medicines, talk with your healthcare provider before using these medicines. Aspirin should never be given to anyone under 25years of age who is ill with a viral infection or fever. It may cause severe liver or brain damage. Â· Your appetite may be poor, so a light diet is fine. Avoid dehydration by drinking 6 to 8 glasses of fluids per day (water, soft drinks, juices, tea, or soup). Extra fluids will help loosen secretions in the nose and lungs.   Â· Over-the-counter cold medicines will not shorten the length of time youâre sick, but they may be helpful for the following symptoms: cough, sore throat, and nasal and sinus congestion. (Note: Do not use decongestants if you have high blood pressure.)  Follow-up care  Follow up with your healthcare provider, or as advised. When to seek medical advice  Call your healthcare provider right away if any of these occur:  Â· Cough with lots of colored sputum (mucus)  Â· Severe headache; face, neck, or ear pain  Â· DifficultyÂ swallowingÂ due to throat pain  Â· Fever of 100.4Â°F (38Â°C) or higher, or as directed by your healthcare provider  Call 911  Call 911 if any of these occur:  Â· Chest pain, shortness of breath, wheezing, or difficulty breathing  Â· Coughing up blood  Â· Inability to swallow due to throat pain  Date Last Reviewed: 9/13/2015  Â© 9574-5401 [employer]. 2016 [facility], [address] [place] [address]. All rights reserved. This information is not intended as a substitute for professional medical care. Always follow your healthcare professional's instructions. Conjunctivitis, Nonspecific    The membrane that coversÂ the white part ofÂ your eye (the conjunctiva) is inflamed. Inflammation happens when your body responds to an injury, allergic reaction, infection, or illness. Symptoms of inflammation in the eye may include redness, irritation, itching, swelling, or burning. These symptoms should go away within the next 24 hours. Conjunctivitis may be related to a particle that was in your eye. If so, itÂ may washÂ out with your tears or irrigation treatment. Being exposed to liquid chemicals or fumes may also cause this reaction. Â   Home care  Â· Apply a cold pack over the eye for 20 minutes at a time. This will reduce pain. To make a cold pack, put ice cubes in a plastic bag that seals at the top. Wrap the bag in a clean, thin towel or cloth. Â· Artificial tears may be prescribed to reduce irritation or redness. Â These should be used 3 to 4 times a day.   Â· You may use acetaminophen or ibuprofen to control pain, unless another medicine was prescribed. (Note: If you have chronic liver or kidney disease, or if you have ever had a stomach ulcer or gastrointestinal bleeding, talk with your healthcare provider before using these medicines.)  Follow-up care  Follow up with your healthcare provider, or as advised. When to seek medical advice  Call your healthcare provider right away if any of these occur:  Â· Increased eyelid swelling  Â· Increased eye pain  Â· Increased redness or drainage from the eye  Â· Increased blurry vision or increased sensitivity to light  Â· Failure of normal vision to return within 24 to 48 hours  Date Last Reviewed: 7/1/2017  Â© 8223-5527 [employer]. 2016 [facility], [facility] [address] All rights reserved. This information is not intended as a substitute for professional medical care. Always follow your healthcare professional's instructions. CC: Chest pain     63y M w/ PMH of CAD s/p quadruple bypass, HTN, and HLD presents with chest pain that started last night. States sudden onset sharp pain radiating across b/l chest that occurred at rest. No alleviating/worsening factors. Went away after taking 4 baby ASA. States symptoms are similar to when he had his last heart attack. Currently asymptomatic. Denies fever, chills, SOB, n/v, abd pain, or numbness/tingling.    In the ED, his BP was 174/79, HR was 60, SpO2 was 98 on room air and temp was 37.1. First set of troponins was <0.01. The rest of the labs were unremarkable. CC: Chest pain     63y M w/ PMH of CAD s/p quadruple bypass complicated , HTN, and HLD presents with chest pain that started last night. States sudden onset sharp pain radiating across b/l chest that occurred at rest. No alleviating/worsening factors. Went away after taking 4 baby ASA. States symptoms are similar to when he had his last heart attack. Currently asymptomatic. Denies fever, chills, SOB, n/v, abd pain, or numbness/tingling.    In the ED, his BP was 174/79, HR was 60, SpO2 was 98 on room air and temp was 37.1. First set of troponins was <0.01. The rest of the labs were unremarkable.     cymbalta 60 mg   Ranexa 500 BID   Plavix 75   HCTZ 12.5   Simvastatin 20   Niacin 500   Diazepam 10 qd CC: Chest pain     63y M w/ PMH of diverticulitis with partial colectomy (2 feet) in 1998, CAD s/p PCI to the LAD then quadruple bypass complicated by staph infection of the wound requiring IV antibiotics for 3 months, HTN, and HLD presents with chest pain that started last night. States sudden onset sharp pain radiating across b/l chest that occurred at rest and woke him up from sleep. It lasted 15 minutes before resolving after he took 4 aspirins. It was not associated with shortness of breath, diaphoresis. Did no radiate to the shoulder or arm. He says it is similar but less severe than the pain he had when he had the bypass. No alleviating/worsening factors. Denies fever, chills, SOB, n/v, abd pain, or numbness/tingling.    The patient also endorses occasional stabbing chest pains on the left side, non radiating and occurring randomly not associated with exertion or rest. He is able to go up flight of stairs without having shortness of breath or chest pain.     In the ED, his BP was 174/79, HR was 60, SpO2 was 98 on room air and temp was 37.1. First set of troponins was <0.01. EKG did not show any acute ischemic changes. The rest of the labs were unremarkable.     cymbalta 60 mg   Ranexa 500 BID   Plavix 75   HCTZ 12.5   Simvastatin 20   Niacin 500   Diazepam 10 qd CC: Chest pain     63y M w/ PMH of diverticulitis with partial colectomy (2 feet) in 1998, CAD s/p PCI to the LAD then quadruple bypass complicated by staph infection of the wound requiring IV antibiotics for 3 months, HTN, and HLD presents with chest pain that started last night. States sudden onset sharp pain radiating across b/l chest that occurred at rest and woke him up from sleep. It lasted 15 minutes before resolving after he took 4 aspirins. It was not associated with shortness of breath, diaphoresis. Did no radiate to the shoulder or arm. He says it is similar but less severe than the pain he had when he had the bypass. No alleviating/worsening factors. Denies fever, chills, SOB, n/v, abd pain, or numbness/tingling.    The patient also endorses occasional stabbing chest pains on the left side, non radiating and occurring randomly not associated with exertion or rest. He is able to go up flight of stairs without having shortness of breath or chest pain.     In the ED, his BP was 174/79, HR was 60, SpO2 was 98 on room air and temp was 37.1. First set of troponins was <0.01. EKG did not show any acute ischemic changes. The rest of the labs were unremarkable.

## 2020-01-01 LAB
CULTURE RESULTS: SIGNIFICANT CHANGE UP
CULTURE RESULTS: SIGNIFICANT CHANGE UP
SPECIMEN SOURCE: SIGNIFICANT CHANGE UP
SPECIMEN SOURCE: SIGNIFICANT CHANGE UP

## 2020-01-02 DIAGNOSIS — E78.00 PURE HYPERCHOLESTEROLEMIA, UNSPECIFIED: ICD-10-CM

## 2020-01-02 DIAGNOSIS — R07.89 OTHER CHEST PAIN: ICD-10-CM

## 2020-01-02 DIAGNOSIS — Z90.49 ACQUIRED ABSENCE OF OTHER SPECIFIED PARTS OF DIGESTIVE TRACT: ICD-10-CM

## 2020-01-02 DIAGNOSIS — I25.10 ATHEROSCLEROTIC HEART DISEASE OF NATIVE CORONARY ARTERY WITHOUT ANGINA PECTORIS: ICD-10-CM

## 2020-01-02 DIAGNOSIS — K80.20 CALCULUS OF GALLBLADDER WITHOUT CHOLECYSTITIS WITHOUT OBSTRUCTION: ICD-10-CM

## 2020-01-02 DIAGNOSIS — Z79.02 LONG TERM (CURRENT) USE OF ANTITHROMBOTICS/ANTIPLATELETS: ICD-10-CM

## 2020-01-02 DIAGNOSIS — Z87.891 PERSONAL HISTORY OF NICOTINE DEPENDENCE: ICD-10-CM

## 2020-01-02 DIAGNOSIS — Z88.8 ALLERGY STATUS TO OTHER DRUGS, MEDICAMENTS AND BIOLOGICAL SUBSTANCES STATUS: ICD-10-CM

## 2020-01-02 DIAGNOSIS — I95.1 ORTHOSTATIC HYPOTENSION: ICD-10-CM

## 2020-01-02 DIAGNOSIS — Z95.5 PRESENCE OF CORONARY ANGIOPLASTY IMPLANT AND GRAFT: ICD-10-CM

## 2020-01-02 DIAGNOSIS — R29.6 REPEATED FALLS: ICD-10-CM

## 2020-01-02 DIAGNOSIS — Z86.19 PERSONAL HISTORY OF OTHER INFECTIOUS AND PARASITIC DISEASES: ICD-10-CM

## 2020-01-02 DIAGNOSIS — Z88.2 ALLERGY STATUS TO SULFONAMIDES: ICD-10-CM

## 2020-01-02 DIAGNOSIS — F41.9 ANXIETY DISORDER, UNSPECIFIED: ICD-10-CM

## 2020-01-02 DIAGNOSIS — R07.9 CHEST PAIN, UNSPECIFIED: ICD-10-CM

## 2020-01-02 DIAGNOSIS — I10 ESSENTIAL (PRIMARY) HYPERTENSION: ICD-10-CM

## 2020-01-03 ENCOUNTER — OUTPATIENT (OUTPATIENT)
Dept: OUTPATIENT SERVICES | Facility: HOSPITAL | Age: 65
LOS: 1 days | Discharge: HOME | End: 2020-01-03
Payer: MEDICARE

## 2020-01-03 DIAGNOSIS — Z95.1 PRESENCE OF AORTOCORONARY BYPASS GRAFT: Chronic | ICD-10-CM

## 2020-01-03 DIAGNOSIS — Z90.49 ACQUIRED ABSENCE OF OTHER SPECIFIED PARTS OF DIGESTIVE TRACT: Chronic | ICD-10-CM

## 2020-01-03 DIAGNOSIS — R10.31 RIGHT LOWER QUADRANT PAIN: ICD-10-CM

## 2020-01-03 PROCEDURE — 74177 CT ABD & PELVIS W/CONTRAST: CPT | Mod: 26

## 2020-01-04 ENCOUNTER — EMERGENCY (EMERGENCY)
Facility: HOSPITAL | Age: 65
LOS: 0 days | Discharge: HOME | End: 2020-01-04
Attending: EMERGENCY MEDICINE | Admitting: EMERGENCY MEDICINE
Payer: MEDICARE

## 2020-01-04 VITALS
SYSTOLIC BLOOD PRESSURE: 125 MMHG | WEIGHT: 164.91 LBS | TEMPERATURE: 100 F | OXYGEN SATURATION: 99 % | HEIGHT: 68 IN | RESPIRATION RATE: 22 BRPM | HEART RATE: 72 BPM | DIASTOLIC BLOOD PRESSURE: 81 MMHG

## 2020-01-04 DIAGNOSIS — R11.0 NAUSEA: ICD-10-CM

## 2020-01-04 DIAGNOSIS — Z90.49 ACQUIRED ABSENCE OF OTHER SPECIFIED PARTS OF DIGESTIVE TRACT: Chronic | ICD-10-CM

## 2020-01-04 DIAGNOSIS — K59.00 CONSTIPATION, UNSPECIFIED: ICD-10-CM

## 2020-01-04 DIAGNOSIS — R10.9 UNSPECIFIED ABDOMINAL PAIN: ICD-10-CM

## 2020-01-04 DIAGNOSIS — R10.84 GENERALIZED ABDOMINAL PAIN: ICD-10-CM

## 2020-01-04 DIAGNOSIS — Z88.2 ALLERGY STATUS TO SULFONAMIDES: ICD-10-CM

## 2020-01-04 DIAGNOSIS — Z88.8 ALLERGY STATUS TO OTHER DRUGS, MEDICAMENTS AND BIOLOGICAL SUBSTANCES STATUS: ICD-10-CM

## 2020-01-04 DIAGNOSIS — Z95.1 PRESENCE OF AORTOCORONARY BYPASS GRAFT: Chronic | ICD-10-CM

## 2020-01-04 PROCEDURE — 99283 EMERGENCY DEPT VISIT LOW MDM: CPT

## 2020-01-04 RX ADMIN — Medication 20 MILLIGRAM(S): at 19:59

## 2020-01-04 NOTE — ED ADULT TRIAGE NOTE - CHIEF COMPLAINT QUOTE
Pt has had abdominal pain x 1 month.  He has had a ct scan and full cardiac workup at [place] last week but could not get a GI appointment until February

## 2020-01-04 NOTE — ED PROVIDER NOTE - CARE PROVIDER_API CALL
Neal Patel (DO)  Gastroenterology  [address] [place] [id number]  Phone: [phone]  Fax: [phone]  Follow Up Time: 1-3 Days

## 2020-01-04 NOTE — ED PROVIDER NOTE - PATIENT PORTAL LINK FT
You can access the FollowMyHealth Patient Portal offered by [facility] by registering at the following website: [email or web address]. By joining [employer]’s FollowMyHealth portal, you will also be able to view your health information using other applications (apps) compatible with our system.

## 2020-01-04 NOTE — ED PROVIDER NOTE - NSFOLLOWUPINSTRUCTIONS_ED_ALL_ED_FT
Follow up with your primary care doctor and GI in 1-2 days     Acute Abdominal Pain    WHAT YOU NEED TO KNOW:    The cause of your abdominal pain may not be found. If a cause is found, treatment will depend on what the cause is.     DISCHARGE INSTRUCTIONS:    Return to the emergency department if:     You vomit blood or cannot stop vomiting.      You have blood in your bowel movement or it looks like tar.       You have bleeding from your rectum.       Your abdomen is larger than usual, more painful, and hard.       You have severe pain in your abdomen.       You stop passing gas and having bowel movements.       You feel weak, dizzy, or faint.    Contact your healthcare provider if:     You have a fever.      You have new signs and symptoms.      Your symptoms do not get better with treatment.       You have questions or concerns about your condition or care.    Medicines may be given to decrease pain, treat an infection, and manage your symptoms. Take your medicine as directed. Call your healthcare provider if you think your medicine is not helping or if you have side effects. Tell him if you are allergic to any medicine. Keep a list of the medicines, vitamins, and herbs you take. Include the amounts, and when and why you take them. Bring the list or the pill bottles to follow-up visits. Carry your medicine list with you in case of an emergency.    Manage your symptoms:     Apply heat on your abdomen for 20 to 30 minutes every 2 hours for as many days as directed. Heat helps decrease pain and muscle spasms.       Manage your stress. Stress may cause abdominal pain. Your healthcare provider may recommend relaxation techniques and deep breathing exercises to help decrease your stress. Your healthcare provider may recommend you talk to someone about your stress or anxiety, such as a counselor or a trusted friend. Get plenty of sleep and exercise regularly.       Limit or do not drink alcohol. Alcohol can make your abdominal pain worse. Ask your healthcare provider if it is safe for you to drink alcohol. Also ask how much is safe for you to drink.       Do not smoke. Nicotine and other chemicals in cigarettes can damage your esophagus and stomach. Ask your healthcare provider for information if you currently smoke and need help to quit. E-cigarettes or smokeless tobacco still contain nicotine. Talk to your healthcare provider before you use these products.     Make changes to the food you eat as directed: Do not eat foods that cause abdominal pain or other symptoms. Eat small meals more often.     Eat more high-fiber foods if you are constipated. High-fiber foods include fruits, vegetables, whole-grain foods, and legumes.       Do not eat foods that cause gas if you have bloating. Examples include broccoli, cabbage, and cauliflower. Do not drink soda or carbonated drinks, because these may also cause gas.       Do not eat foods or drinks that contain sorbitol or fructose if you have diarrhea and bloating. Some examples are fruit juices, candy, jelly, and sugar-free gum.       Do not eat high-fat foods, such as fried foods, cheeseburgers, hot dogs, and desserts.      Limit or do not drink caffeine. Caffeine may make symptoms, such as heart burn or nausea, worse.       Drink plenty of liquids to prevent dehydration from diarrhea or vomiting. Ask your healthcare provider how much liquid to drink each day and which liquids are best for you.     Follow up with your healthcare provider as directed: Write down your questions so you remember to ask them during your visits.       © Copyright [employer] 2019 All illustrations and images included in CareNotes are the copyrighted property of [employer] or [employer]

## 2020-01-04 NOTE — ED PROVIDER NOTE - OBJECTIVE STATEMENT
63 y/o M with PMH CABG, PCI and diverticulitis presents c/o lower abdominal pain for the past few weeks. Pt was sent by PMD to [facility] yesterday for CT with IV and PO contrast, which were negative. Pt states he has taken percocet and milk of magnesia for pain and constipation, but with no relief. Pain is currently described as mild to moderate, non-radiating and with no alleviating/aggravating factors. Pt admits to some nausea ,but no diarrhea or bloody stool. Pt denies fever, chills, CP, SOB, vomiting, hematemesis, hematochezia and melena.

## 2020-01-04 NOTE — ED PROVIDER NOTE - PHYSICAL EXAMINATION
Physical Exam    Vital Signs: I have reviewed the initial vital signs.  Constitutional: well-nourished, appears stated age, no acute distress  Cardiovascular: regular rate, regular rhythm, well-perfused extremities  Respiratory: unlabored respiratory effort, clear to auscultation bilaterally  Gastrointestinal: (+) diffusely TTP throughout abdomen, soft, ND, no pulsatile mass  Musculoskeletal: supple neck, no lower extremity edema  Integumentary: warm, dry, no rash  Neurologic: awake, alert, cranial nerves II-XII grossly intact, extremities’ motor and sensory functions grossly intact  Psychiatric: appropriate mood, appropriate affect Physical Exam    Vital Signs: I have reviewed the initial vital signs.  Constitutional: well-nourished, appears stated age, no acute distress  Cardiovascular: regular rate, regular rhythm, well-perfused extremities  Respiratory: unlabored respiratory effort, clear to auscultation bilaterally  Gastrointestinal: (+) diffusely TTP throughout abdomen, normal bowel sounds, soft, ND, no pulsatile mass  Musculoskeletal: supple neck, no lower extremity edema  Integumentary: warm, dry, no rash  Neurologic: awake, alert, cranial nerves II-XII grossly intact, extremities’ motor and sensory functions grossly intact  Psychiatric: appropriate mood, appropriate affect Physical Exam    Vital Signs: I have reviewed the initial vital signs.  Constitutional: well-nourished, appears stated age, no acute distress  Cardiovascular: regular rate, regular rhythm, well-perfused extremities  Respiratory: unlabored respiratory effort, clear to auscultation bilaterally  Gastrointestinal: Soft NT/ND, normal bowel sounds, soft, ND, no pulsatile mass  Musculoskeletal: supple neck, no lower extremity edema  Integumentary: warm, dry, no rash  Neurologic: awake, alert, cranial nerves II-XII grossly intact, extremities’ motor and sensory functions grossly intact  Psychiatric: appropriate mood, appropriate affect.

## 2020-01-04 NOTE — ED ADULT NURSE NOTE - CHIEF COMPLAINT QUOTE
Pt has had abdominal pain x 1 month.  He has had a ct scan and full cardiac workup at [place] last week but could not get a GI appointment until February

## 2020-01-04 NOTE — ED PROVIDER NOTE - ATTENDING CONTRIBUTION TO CARE
I personally evaluated the patient. I reviewed the Resident’s or Physician Assistant’s note (as assigned above), and agree with the findings and plan except as documented in my note.  Chart reviewed. H/O CABG, diverticulitis, presents with lower abdominal pain for months. Was in ED yesterday and had negative. CT. Given Percocet without relief. Las BM today. Exam shows alert patient in no distress, HEENT NCAT, lungs clear, RR S1S2, abdomen soft Nt +BS, no CCE.

## 2020-01-04 NOTE — ED PROVIDER NOTE - NS ED ROS FT
Constitutional: (-) fever (-)chills  (-)sweats  Eyes/ENT: (-) blurry vision (-) epistaxis  (-)rhinorrhea  (-)sore throat  Cardiovascular: (-) chest pain, (-) palpitations   Respiratory: (-) cough, (-) shortness of breath  Gastrointestinal: (-) vomiting, (-) diarrhea  (+) abdominal pain  Musculoskeletal: (-) neck pain, (-) back pain, (-) joint pain  Integumentary: (-) rash, (-) edema  Neurological: (-) headache, (-) altered mental status  (-) LOC  Allergic/Immunologic: (-) pruritus Constitutional: (-) fever (-)chills  (-)sweats  Eyes/ENT: (-) blurry vision (-) epistaxis  (-)rhinorrhea  (-)sore throat  Cardiovascular: (-) chest pain, (-) palpitations   Respiratory: (-) cough, (-) shortness of breath  Gastrointestinal: (-) vomiting, (-) diarrhea  (+) abdominal pain (see HPI)  Musculoskeletal: (-) neck pain, (-) back pain, (-) joint pain  Integumentary: (-) rash, (-) edema  Neurological: (-) headache, (-) altered mental status  (-) LOC  Allergic/Immunologic: (-) pruritus

## 2020-01-04 NOTE — ED PROVIDER NOTE - CHIEF COMPLAINT
The patient is a 64y Male complaining of abdominal pain.
denies CP/denies jaw pain/ denies arm pain/ pt recently stopped smoking and is feeling anxious

## 2020-01-31 ENCOUNTER — APPOINTMENT (OUTPATIENT)
Dept: NEUROLOGY | Facility: CLINIC | Age: 65
End: 2020-01-31

## 2020-02-13 ENCOUNTER — OUTPATIENT (OUTPATIENT)
Dept: OUTPATIENT SERVICES | Facility: HOSPITAL | Age: 65
LOS: 1 days | Discharge: HOME | End: 2020-02-13
Payer: MEDICARE

## 2020-02-13 DIAGNOSIS — Z95.1 PRESENCE OF AORTOCORONARY BYPASS GRAFT: Chronic | ICD-10-CM

## 2020-02-13 DIAGNOSIS — M54.5 LOW BACK PAIN: ICD-10-CM

## 2020-02-13 DIAGNOSIS — Z90.49 ACQUIRED ABSENCE OF OTHER SPECIFIED PARTS OF DIGESTIVE TRACT: Chronic | ICD-10-CM

## 2020-02-13 PROCEDURE — 72146 MRI CHEST SPINE W/O DYE: CPT | Mod: 26

## 2020-02-18 ENCOUNTER — APPOINTMENT (OUTPATIENT)
Dept: UROLOGY | Facility: CLINIC | Age: 65
End: 2020-02-18
Payer: MEDICARE

## 2020-02-18 DIAGNOSIS — N40.1 BENIGN PROSTATIC HYPERPLASIA WITH LOWER URINARY TRACT SYMPMS: ICD-10-CM

## 2020-02-18 DIAGNOSIS — N52.9 MALE ERECTILE DYSFUNCTION, UNSPECIFIED: ICD-10-CM

## 2020-02-18 DIAGNOSIS — N13.8 BENIGN PROSTATIC HYPERPLASIA WITH LOWER URINARY TRACT SYMPMS: ICD-10-CM

## 2020-02-18 PROCEDURE — 99214 OFFICE O/P EST MOD 30 MIN: CPT

## 2020-02-18 NOTE — ASSESSMENT
[FreeTextEntry1] : #1. Low abdominal pain. Likely not of urologic origin based on history and symptom complex.\par #2 BPH.\par #3. Bladder thickening-- may in fact be related to obstructing BPH.\par \par Plan\par -Renal US\par -Bladder US\par -Uroflow\par -Return after studies

## 2020-02-18 NOTE — HISTORY OF PRESENT ILLNESS
[Urinary Urgency] : urinary urgency [Urinary Frequency] : urinary frequency [Nocturia] : nocturia [None] : None [FreeTextEntry1] : 64-year-old male here with his wife as followup stent by his PCP for evaluation of lower abdominal pain. Patient reports this pain present constantly for the previous 2 months not associated with voiding symptoms. He reports undergoing multiple imaging studies which has not identified clinical pathology. He denies any change in voiding symptoms from his last visit 10 2019.Reports drinking large amounts of fluids throughout the day.Also reports good response with Viagra previously ordered\par 2/2020 CT scan reports= thickened bladder wall- 8 mm, diffuse and circumferential. [Straining] : no straining [Weak Stream] : no weak stream [Post-Void Dribbling] : no post-void dribbling [Erectile Dysfunction] : no Erectile Dysfunction [Fever] : no fever [Dysuria] : no dysuria [Hematuria - Gross] : no gross hematuria [Anorexia] : no anorexia

## 2020-02-18 NOTE — PHYSICAL EXAM
[General Appearance - Well Nourished] : well nourished [General Appearance - Well Developed] : well developed [Normal Appearance] : normal appearance [Well Groomed] : well groomed [General Appearance - In No Acute Distress] : no acute distress [Abdomen Soft] : soft [Abdomen Tenderness] : non-tender [Costovertebral Angle Tenderness] : no ~M costovertebral angle tenderness [Urethral Meatus] : meatus normal [Scrotum] : the scrotum was normal [Urinary Bladder Findings] : the bladder was normal on palpation [Skin Color & Pigmentation] : normal skin color and pigmentation [Edema] : no peripheral edema [Respiration, Rhythm And Depth] : normal respiratory rhythm and effort [] : no respiratory distress [Oriented To Time, Place, And Person] : oriented to person, place, and time [Exaggerated Use Of Accessory Muscles For Inspiration] : no accessory muscle use [Affect] : the affect was normal [Mood] : the mood was normal [Normal Station and Gait] : the gait and station were normal for the patient's age [Not Anxious] : not anxious [No Focal Deficits] : no focal deficits [No Palpable Adenopathy] : no palpable adenopathy [FreeTextEntry1] : done by pcp

## 2020-02-18 NOTE — LETTER BODY
[Consult Letter:] : I had the pleasure of evaluating your patient, [unfilled]. [Dear  ___] : Dear  [unfilled], [( Thank you for referring [unfilled] for consultation for _____ )] : Thank you for referring [unfilled] for consultation for [unfilled] [Consult Closing:] : Thank you very much for allowing me to participate in the care of this patient.  If you have any questions, please do not hesitate to contact me. [FreeTextEntry1] : This is to summarize the consultation for Robinson DUNLAP seen February 18, 2020.\par \par Impression= #1. Bladder thickening as reported on CT scanning. The report references diffuse and circumferential thickening, this is usually associated with bladder outlet obstruction. No distinct bladder mass was reported. #2. BPH, clinically  .#3. Abdominal pain--clinically unlikely of urologic origin\par \par Plan= #1. Renal ultrasound. #2. Bladder  ultrasound with PVR. #3. Uroflow. #4.. Reevaluation

## 2020-02-25 ENCOUNTER — APPOINTMENT (OUTPATIENT)
Dept: UROLOGY | Facility: CLINIC | Age: 65
End: 2020-02-25

## 2020-02-26 ENCOUNTER — APPOINTMENT (OUTPATIENT)
Dept: GASTROENTEROLOGY | Facility: CLINIC | Age: 65
End: 2020-02-26

## 2020-03-13 ENCOUNTER — FORM ENCOUNTER (OUTPATIENT)
Age: 65
End: 2020-03-13

## 2020-03-14 ENCOUNTER — OUTPATIENT (OUTPATIENT)
Dept: OUTPATIENT SERVICES | Facility: HOSPITAL | Age: 65
LOS: 1 days | Discharge: HOME | End: 2020-03-14
Payer: MEDICARE

## 2020-03-14 DIAGNOSIS — Z90.49 ACQUIRED ABSENCE OF OTHER SPECIFIED PARTS OF DIGESTIVE TRACT: Chronic | ICD-10-CM

## 2020-03-14 DIAGNOSIS — R91.1 SOLITARY PULMONARY NODULE: ICD-10-CM

## 2020-03-14 DIAGNOSIS — Z95.1 PRESENCE OF AORTOCORONARY BYPASS GRAFT: Chronic | ICD-10-CM

## 2020-03-14 PROCEDURE — 71250 CT THORAX DX C-: CPT | Mod: 26

## 2020-03-17 ENCOUNTER — APPOINTMENT (OUTPATIENT)
Dept: CARDIOTHORACIC SURGERY | Facility: CLINIC | Age: 65
End: 2020-03-17
Payer: MEDICARE

## 2020-03-17 VITALS
HEIGHT: 68 IN | OXYGEN SATURATION: 98 % | SYSTOLIC BLOOD PRESSURE: 158 MMHG | DIASTOLIC BLOOD PRESSURE: 99 MMHG | RESPIRATION RATE: 12 BRPM | BODY MASS INDEX: 31.83 KG/M2 | WEIGHT: 210 LBS | HEART RATE: 65 BPM | TEMPERATURE: 98.2 F

## 2020-03-17 PROCEDURE — 99212 OFFICE O/P EST SF 10 MIN: CPT

## 2020-03-17 RX ORDER — FUROSEMIDE 20 MG/1
20 TABLET ORAL
Refills: 0 | Status: ACTIVE | COMMUNITY

## 2020-03-17 NOTE — PHYSICAL EXAM
[Sclera] : the sclera and conjunctiva were normal [PERRL With Normal Accommodation] : pupils were equal in size, round, and reactive to light [Extraocular Movements] : extraocular movements were intact [Neck Appearance] : the appearance of the neck was normal [Neck Cervical Mass (___cm)] : no neck mass was observed [Jugular Venous Distention Increased] : there was no jugular-venous distention [Thyroid Diffuse Enlargement] : the thyroid was not enlarged [Thyroid Nodule] : there were no palpable thyroid nodules [Auscultation Breath Sounds / Voice Sounds] : lungs were clear to auscultation bilaterally [Heart Rate And Rhythm] : heart rate was normal and rhythm regular [Heart Sounds] : normal S1 and S2 [Heart Sounds Gallop] : no gallops [Murmurs] : no murmurs [Heart Sounds Pericardial Friction Rub] : no pericardial rub [Skin Color & Pigmentation] : normal skin color and pigmentation [Skin Turgor] : normal skin turgor [] : no rash [Deep Tendon Reflexes (DTR)] : deep tendon reflexes were 2+ and symmetric [Sensation] : the sensory exam was normal to light touch and pinprick [No Focal Deficits] : no focal deficits [Oriented To Time, Place, And Person] : oriented to person, place, and time [Impaired Insight] : insight and judgment were intact [Affect] : the affect was normal

## 2020-03-18 NOTE — DATA REVIEWED
[FreeTextEntry1] : EXAM: CT CHEST PROCEDURE DATE: 03/14/2020 \par \par Comparison: March 24, 2015. Correlation is made with CT abdomen and pelvis \par December 7, 2019 and January 3, 2020. \par \par Findings: \par \par Tubes/Lines: None. \par \par Lungs, Pleura, and Airways:Trachea and endobronchial tree are patent. No \par airspace consolidation, pleural effusion or pneumothorax. No architectural \par distortion, honeycombing or bronchiectasis. Chronic bilateral areas of \par atelectasis. No new suspicious nodule or mass. Multiple stable benign \par nodules including a 5 mm left lower lobe subpleural nodule (series 4 image \par 203) and a 3 mm right middle lobe subpleural nodule (series 4 image 161). \par \par Mediastinum/Lymph Nodes:No enlarged mediastinal, hilar or axillary lymph \par nodes. \par \par Heart/Great Vessels: The patient is post CABG. Heart size is within normal \par limits without pericardial effusion. Thoracic aorta and main pulmonary \par artery are normal in caliber. \par \par Abdomen: Cholelithiasis. \par \par Bones and soft tissues: Degenerative changes of the spine. No suspicious \par lytic or blastic lesions. \par \par IMPRESSION: \par \par No acute intrathoracic pathology. \par \par Stable benign subcentimeter nodules, as above.

## 2020-03-18 NOTE — HISTORY OF PRESENT ILLNESS
[FreeTextEntry1] : Mr Melo is a 65 y/o male that arrives today for a follow up regarding his lung nodule. He is known to Dr. Watt as a prior patient for CABG. His PMH is significant for diverticulitis with partial colectomy (2 feet) in 1998, CAD s/p PCI to the LAD then quadruple bypass complicated by staph infection of the wound requiring IV antibiotics for 3 months, HTN, and HLD, and anxiety. \par Lung nodule is 6 mm. Not a full scan noted. Plan will be to repeat a CT chest in 3 months with a non contrast chest CT.

## 2020-03-18 NOTE — ADDENDUM
[FreeTextEntry1] : Patient's CT scan is essentially unchanged.\par \par Will refer to pulmonary specialist.\par \par -FMR\par \par I personally performed the services described in the documentation, reviewed the documentation recorded by the scribe in my presence and it accurately and completely records my words and actions.\par

## 2020-03-18 NOTE — ASSESSMENT
[FreeTextEntry1] : Mr Melo is a 63 y/o male that arrives today for a follow up regarding his lung nodule. He is known to Dr. Watt as a prior patient for CABG. His PMH is significant for diverticulitis with partial colectomy (2 feet) in 1998, CAD s/p PCI to the LAD then quadruple bypass complicated by staph infection of the wound requiring IV antibiotics for 3 months, HTN, and HLD, and anxiety.  [place] NOdule is unchanged. Follow up with Pulmonary for appointment. \par \par NATALIA Starks, KELVIN-BC am acting as the scribe for Dr. Watt\par \par

## 2020-07-04 ENCOUNTER — TRANSCRIPTION ENCOUNTER (OUTPATIENT)
Age: 65
End: 2020-07-04

## 2020-07-06 ENCOUNTER — EMERGENCY (EMERGENCY)
Facility: HOSPITAL | Age: 65
LOS: 0 days | Discharge: HOME | End: 2020-07-06
Attending: EMERGENCY MEDICINE | Admitting: EMERGENCY MEDICINE
Payer: MEDICARE

## 2020-07-06 VITALS
HEART RATE: 71 BPM | SYSTOLIC BLOOD PRESSURE: 177 MMHG | RESPIRATION RATE: 18 BRPM | OXYGEN SATURATION: 97 % | TEMPERATURE: 98 F | DIASTOLIC BLOOD PRESSURE: 88 MMHG

## 2020-07-06 DIAGNOSIS — Z88.2 ALLERGY STATUS TO SULFONAMIDES: ICD-10-CM

## 2020-07-06 DIAGNOSIS — Z91.09 OTHER ALLERGY STATUS, OTHER THAN TO DRUGS AND BIOLOGICAL SUBSTANCES: ICD-10-CM

## 2020-07-06 DIAGNOSIS — E78.00 PURE HYPERCHOLESTEROLEMIA, UNSPECIFIED: ICD-10-CM

## 2020-07-06 DIAGNOSIS — Z95.5 PRESENCE OF CORONARY ANGIOPLASTY IMPLANT AND GRAFT: ICD-10-CM

## 2020-07-06 DIAGNOSIS — Z87.19 PERSONAL HISTORY OF OTHER DISEASES OF THE DIGESTIVE SYSTEM: ICD-10-CM

## 2020-07-06 DIAGNOSIS — Z95.1 PRESENCE OF AORTOCORONARY BYPASS GRAFT: Chronic | ICD-10-CM

## 2020-07-06 DIAGNOSIS — Z90.49 ACQUIRED ABSENCE OF OTHER SPECIFIED PARTS OF DIGESTIVE TRACT: ICD-10-CM

## 2020-07-06 DIAGNOSIS — M54.6 PAIN IN THORACIC SPINE: ICD-10-CM

## 2020-07-06 DIAGNOSIS — Z90.49 ACQUIRED ABSENCE OF OTHER SPECIFIED PARTS OF DIGESTIVE TRACT: Chronic | ICD-10-CM

## 2020-07-06 DIAGNOSIS — I10 ESSENTIAL (PRIMARY) HYPERTENSION: ICD-10-CM

## 2020-07-06 DIAGNOSIS — Z87.891 PERSONAL HISTORY OF NICOTINE DEPENDENCE: ICD-10-CM

## 2020-07-06 PROCEDURE — 99282 EMERGENCY DEPT VISIT SF MDM: CPT

## 2020-07-06 NOTE — ED ADULT TRIAGE NOTE - CHIEF COMPLAINT QUOTE
"yesterday I started having pain in my upper back radiating down my back. I tried to poop this morning and my pain went from a 10 to a 20. pain is so severe I can walk"

## 2020-07-06 NOTE — ED PROVIDER NOTE - CARE PROVIDER_API CALL
Walt Huitron  ANESTHESIOLOGY  [address] [place] [address]  Phone: [phone]  Fax: [phone]  Follow Up Time: Urgent

## 2020-07-06 NOTE — ED PROVIDER NOTE - PATIENT PORTAL LINK FT
You can access the FollowMyHealth Patient Portal offered by [facility] by registering at the following website: [email or web address]. By joining [facility]’s FollowMyHealth portal, you will also be able to view your health information using other applications (apps) compatible with our system.

## 2020-07-06 NOTE — ED PROVIDER NOTE - PHYSICAL EXAMINATION
CONST: Well appearing in NAD  NECK: Non-tender, no c-spine tenderness.  CARD: Normal S1 S2; Normal rate and rhythm  RESP: Equal BS B/L, No wheezes, rhonchi or rales. No distress  GI: Soft, non-tender, non-distended.  MS: Normal ROM in all extremities. No midline spine tenderness. Pain elicited to mid-thoracic paraspinal regions bilaterally with ROM, lateral rotation and having pt sit forward.   SKIN: Warm, dry, no acute rashes. Good turgor  NEURO: A&Ox3, No focal deficits. Strength 5/5 with no sensory deficits. Steady gait

## 2020-07-06 NOTE — ED ADULT NURSE NOTE - NSIMPLEMENTINTERV_GEN_ALL_ED
Implemented All Universal Safety Interventions:  [place] to call system. Call bell, personal items and telephone within reach. Instruct patient to call for assistance. Room bathroom lighting operational. Non-slip footwear when patient is off stretcher. Physically safe environment: no spills, clutter or unnecessary equipment. Stretcher in lowest position, wheels locked, appropriate side rails in place.
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
## 2020-07-06 NOTE — ED PROVIDER NOTE - ATTENDING CONTRIBUTION TO CARE
back pain over 2 days that occurred with twisting motion that is not aassociated with sob, abd pain, chest pain, or any neurologic symptoms. pains worsens with movement. not improved with narcotics. no history of trauma, no fever. hsitory of back pain in past being managed by pain management for which patient has appointment today however he came to ED requesting MRI and IV narcotics for pain management. exam shows no piont tenderness, and nml sensation and strenght in upper and lower ext with ability to ambulate, no abd tenderness, and nml chest examination. imp: back pain, referred to pain management appointment for further pain control. offered muscle relaxor and nsaids here but patient refused.

## 2020-07-06 NOTE — ED PROVIDER NOTE - CLINICAL SUMMARY MEDICAL DECISION MAKING FREE TEXT BOX
back pain, referred to pain management appointment for further pain control. offered muscle relaxor and nsaids here but patient refused.

## 2021-01-25 ENCOUNTER — RESULT REVIEW (OUTPATIENT)
Age: 66
End: 2021-01-25

## 2021-01-25 ENCOUNTER — OUTPATIENT (OUTPATIENT)
Dept: OUTPATIENT SERVICES | Facility: HOSPITAL | Age: 66
LOS: 1 days | Discharge: HOME | End: 2021-01-25
Payer: MEDICARE

## 2021-01-25 DIAGNOSIS — Z95.1 PRESENCE OF AORTOCORONARY BYPASS GRAFT: Chronic | ICD-10-CM

## 2021-01-25 DIAGNOSIS — Z90.49 ACQUIRED ABSENCE OF OTHER SPECIFIED PARTS OF DIGESTIVE TRACT: Chronic | ICD-10-CM

## 2021-01-25 PROCEDURE — 77012 CT SCAN FOR NEEDLE BIOPSY: CPT | Mod: 26

## 2021-01-25 NOTE — PROGRESS NOTE ADULT - SUBJECTIVE AND OBJECTIVE BOX
INTERVENTIONAL RADIOLOGY BRIEF-OPERATIVE NOTE    Procedure: CT guided right SI joint injection    Pre-Op Diagnosis: Sacroilitis    Post-Op Diagnosis: Same    Attending: Elliot Landau  Resident: None    Anesthesia (type):  [ ] General Anesthesia  [ ] Sedation  [ ] Spinal Anesthesia  [x] Local/Regional    Contrast: 0    Estimated Blood Loss: < 5 cc    Condition:   [ ] Critical  [ ] Serious  [ ] Fair   [x] Good    Findings/Follow up Plan of Care: CT guided right SI joint injection performed using 2.5 cc 0.25% Bupivacaine and 40 mg Kenalog. Patient tolerated procedure well.     Specimens Removed: None    Implants: None    Complications: None    Disposition: D/C home      Please call Interventional Radiology [phone] with any questions, concerns, or issues.

## 2021-02-01 DIAGNOSIS — Z88.2 ALLERGY STATUS TO SULFONAMIDES: ICD-10-CM

## 2021-02-01 DIAGNOSIS — M46.1 SACROILIITIS, NOT ELSEWHERE CLASSIFIED: ICD-10-CM

## 2021-11-08 ENCOUNTER — OUTPATIENT (OUTPATIENT)
Dept: OUTPATIENT SERVICES | Facility: HOSPITAL | Age: 66
LOS: 1 days | Discharge: HOME | End: 2021-11-08
Payer: MEDICARE

## 2021-11-08 DIAGNOSIS — Z95.1 PRESENCE OF AORTOCORONARY BYPASS GRAFT: Chronic | ICD-10-CM

## 2021-11-08 DIAGNOSIS — Z90.49 ACQUIRED ABSENCE OF OTHER SPECIFIED PARTS OF DIGESTIVE TRACT: Chronic | ICD-10-CM

## 2021-11-08 DIAGNOSIS — M54.6 PAIN IN THORACIC SPINE: ICD-10-CM

## 2021-11-08 PROCEDURE — 72072 X-RAY EXAM THORAC SPINE 3VWS: CPT | Mod: 26

## 2021-11-24 NOTE — ED PROVIDER NOTE - NS ED MD TWO NIGHTS YN
Spoke with daughter about UA/urine culture results. Verbalized understanding. Daughter received the Cipro from Pharmacy that Dr. Balderas originally prescribed. She will start giving her father the PO Cipro. She will monitor for worsening s/s of UTI. If s/s and/or hematura worsen -patient to go to ER.    Yes

## 2021-12-04 ENCOUNTER — OUTPATIENT (OUTPATIENT)
Dept: OUTPATIENT SERVICES | Facility: HOSPITAL | Age: 66
LOS: 1 days | Discharge: HOME | End: 2021-12-04
Payer: COMMERCIAL

## 2021-12-04 DIAGNOSIS — Z90.49 ACQUIRED ABSENCE OF OTHER SPECIFIED PARTS OF DIGESTIVE TRACT: Chronic | ICD-10-CM

## 2021-12-04 DIAGNOSIS — Z95.1 PRESENCE OF AORTOCORONARY BYPASS GRAFT: Chronic | ICD-10-CM

## 2021-12-04 DIAGNOSIS — R05.1 ACUTE COUGH: ICD-10-CM

## 2021-12-04 DIAGNOSIS — R07.9 CHEST PAIN, UNSPECIFIED: ICD-10-CM

## 2021-12-04 PROCEDURE — 71046 X-RAY EXAM CHEST 2 VIEWS: CPT | Mod: 26

## 2022-02-02 ENCOUNTER — APPOINTMENT (OUTPATIENT)
Age: 67
End: 2022-02-02
Payer: MEDICARE

## 2022-02-02 DIAGNOSIS — R05.3 CHRONIC COUGH: ICD-10-CM

## 2022-02-02 PROCEDURE — 99214 OFFICE O/P EST MOD 30 MIN: CPT | Mod: 25

## 2022-02-02 PROCEDURE — 71046 X-RAY EXAM CHEST 2 VIEWS: CPT

## 2022-02-02 RX ORDER — PREDNISONE 20 MG/1
20 TABLET ORAL
Qty: 9 | Refills: 0 | Status: ACTIVE | COMMUNITY
Start: 2022-02-02 | End: 1900-01-01

## 2022-02-02 NOTE — PHYSICAL EXAM
[No Acute Distress] : no acute distress [Normal Oropharynx] : normal oropharynx [Normal Appearance] : normal appearance [No Neck Mass] : no neck mass [Normal Rate/Rhythm] : normal rate/rhythm [Normal S1, S2] : normal s1, s2 [No Murmurs] : no murmurs [No Resp Distress] : no resp distress [No Abnormalities] : no abnormalities [Normal Gait] : normal gait [No Clubbing] : no clubbing [No Cyanosis] : no cyanosis [FROM] : FROM [1+ Pitting] : 1+ pitting [No Focal Deficits] : no focal deficits [Oriented x3] : oriented x3 [Normal Affect] : normal affect [TextBox_68] : crackles rhonchi Left lower

## 2022-02-02 NOTE — REASON FOR VISIT
[Acute] : an acute visit [Cough] : cough [Shortness of Breath] : shortness of breath [Pulmonary Nodules] : pulmonary nodules

## 2022-02-02 NOTE — PROCEDURE
[FreeTextEntry1] : Reason :cough \par Overall Findings:\par \par View Ap and lateral  \par \par \par Lung Fields:\par \par ?? infiltrate left hilar                   \par \par Pleura:\par \par No Pleural Effusions                      \par \par \par \par final Interpretation :// infiltrate left hilar \par

## 2022-03-16 ENCOUNTER — RESULT REVIEW (OUTPATIENT)
Age: 67
End: 2022-03-16

## 2022-03-16 ENCOUNTER — OUTPATIENT (OUTPATIENT)
Dept: OUTPATIENT SERVICES | Facility: HOSPITAL | Age: 67
LOS: 1 days | Discharge: HOME | End: 2022-03-16
Payer: MEDICARE

## 2022-03-16 DIAGNOSIS — Z95.1 PRESENCE OF AORTOCORONARY BYPASS GRAFT: Chronic | ICD-10-CM

## 2022-03-16 DIAGNOSIS — Z90.49 ACQUIRED ABSENCE OF OTHER SPECIFIED PARTS OF DIGESTIVE TRACT: Chronic | ICD-10-CM

## 2022-03-16 DIAGNOSIS — R05.3 CHRONIC COUGH: ICD-10-CM

## 2022-03-16 PROCEDURE — 71250 CT THORAX DX C-: CPT | Mod: 26

## 2022-03-29 ENCOUNTER — APPOINTMENT (OUTPATIENT)
Age: 67
End: 2022-03-29
Payer: MEDICARE

## 2022-03-29 VITALS
HEIGHT: 68 IN | RESPIRATION RATE: 14 BRPM | HEART RATE: 74 BPM | BODY MASS INDEX: 32.58 KG/M2 | SYSTOLIC BLOOD PRESSURE: 124 MMHG | DIASTOLIC BLOOD PRESSURE: 64 MMHG | OXYGEN SATURATION: 97 % | WEIGHT: 215 LBS

## 2022-03-29 DIAGNOSIS — R06.02 SHORTNESS OF BREATH: ICD-10-CM

## 2022-03-29 DIAGNOSIS — R91.1 SOLITARY PULMONARY NODULE: ICD-10-CM

## 2022-03-29 DIAGNOSIS — G47.33 OBSTRUCTIVE SLEEP APNEA (ADULT) (PEDIATRIC): ICD-10-CM

## 2022-03-29 PROCEDURE — 99214 OFFICE O/P EST MOD 30 MIN: CPT

## 2022-03-29 RX ORDER — ALBUTEROL SULFATE 90 UG/1
108 (90 BASE) INHALANT RESPIRATORY (INHALATION)
Qty: 1 | Refills: 0 | Status: ACTIVE | COMMUNITY
Start: 2022-03-29 | End: 1900-01-01

## 2022-03-30 PROBLEM — R06.02 SOB (SHORTNESS OF BREATH) ON EXERTION: Status: ACTIVE | Noted: 2022-02-02

## 2022-04-14 ENCOUNTER — APPOINTMENT (OUTPATIENT)
Dept: UROLOGY | Facility: CLINIC | Age: 67
End: 2022-04-14

## 2022-04-25 NOTE — ED PROVIDER NOTE - CCCP TRG CHIEF CMPLNT
chest pain
Consent: Written consent obtained, risks reviewed including but not limited to rash, itching, allergic reaction, systemic rash, remote possiblity of anaphylaxis to allergen.
Post-Care Instructions: I reviewed with the patient in detail post-care instructions. Patient should not sweat, pick at, or get the patches wet for 48 hours.
Number Of Patches (Maximum Allowable Per Dos By Cms Is 90): 36
Detail Level: None

## 2022-04-26 NOTE — ED PROVIDER NOTE - CLINICAL SUMMARY MEDICAL DECISION MAKING FREE TEXT BOX
Pt w/ cp. trop neg, cxr wnl, ekg non ischemic. Given overall risk and HPI, will admit for cp for serial testing, specialist consult. - - -

## 2022-06-22 ENCOUNTER — NON-APPOINTMENT (OUTPATIENT)
Age: 67
End: 2022-06-22

## 2022-07-05 ENCOUNTER — APPOINTMENT (OUTPATIENT)
Age: 67
End: 2022-07-05

## 2022-08-04 ENCOUNTER — APPOINTMENT (OUTPATIENT)
Age: 67
End: 2022-08-04

## 2022-09-29 ENCOUNTER — NON-APPOINTMENT (OUTPATIENT)
Age: 67
End: 2022-09-29

## 2022-10-19 ENCOUNTER — LABORATORY RESULT (OUTPATIENT)
Age: 67
End: 2022-10-19

## 2022-10-19 ENCOUNTER — NON-APPOINTMENT (OUTPATIENT)
Age: 67
End: 2022-10-19

## 2022-10-19 ENCOUNTER — APPOINTMENT (OUTPATIENT)
Dept: NEUROLOGY | Facility: CLINIC | Age: 67
End: 2022-10-19
Payer: MEDICARE

## 2022-10-19 VITALS
SYSTOLIC BLOOD PRESSURE: 152 MMHG | HEART RATE: 80 BPM | TEMPERATURE: 98 F | OXYGEN SATURATION: 98 % | WEIGHT: 215 LBS | BODY MASS INDEX: 32.58 KG/M2 | DIASTOLIC BLOOD PRESSURE: 72 MMHG | HEIGHT: 68 IN

## 2022-10-19 DIAGNOSIS — R25.2 CRAMP AND SPASM: ICD-10-CM
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
## 2022-10-19 PROCEDURE — 99204 OFFICE O/P NEW MOD 45 MIN: CPT

## 2022-10-19 RX ORDER — LISINOPRIL 20 MG/1
20 TABLET ORAL
Qty: 90 | Refills: 0 | Status: ACTIVE | COMMUNITY
Start: 2022-05-12

## 2022-10-19 RX ORDER — CLOTRIMAZOLE AND BETAMETHASONE DIPROPIONATE 10; .5 MG/G; MG/G
1-0.05 CREAM TOPICAL
Qty: 45 | Refills: 0 | Status: ACTIVE | COMMUNITY
Start: 2021-09-09

## 2022-10-19 RX ORDER — CEFUROXIME AXETIL 500 MG/1
500 TABLET ORAL
Qty: 14 | Refills: 0 | Status: ACTIVE | COMMUNITY
Start: 2022-09-30

## 2022-10-19 RX ORDER — TRIAMCINOLONE ACETONIDE 1 MG/G
0.1 CREAM TOPICAL
Qty: 80 | Refills: 0 | Status: ACTIVE | COMMUNITY
Start: 2022-09-01

## 2022-10-19 RX ORDER — OXYCODONE AND ACETAMINOPHEN 10; 325 MG/1; MG/1
10-325 TABLET ORAL
Qty: 150 | Refills: 0 | Status: ACTIVE | COMMUNITY
Start: 2022-06-14

## 2022-10-19 RX ORDER — OXYCODONE 20 MG/1
20 TABLET ORAL
Qty: 30 | Refills: 0 | Status: ACTIVE | COMMUNITY
Start: 2022-06-14

## 2022-10-19 RX ORDER — DIAZEPAM 10 MG/1
10 TABLET ORAL
Qty: 90 | Refills: 0 | Status: ACTIVE | COMMUNITY
Start: 2022-06-25

## 2022-10-19 RX ORDER — CLOTRIMAZOLE 10 MG/1
10 LOZENGE ORAL
Qty: 50 | Refills: 0 | Status: ACTIVE | COMMUNITY
Start: 2022-06-07

## 2022-10-19 RX ORDER — PREDNISONE 10 MG/1
10 TABLET ORAL
Qty: 15 | Refills: 0 | Status: ACTIVE | COMMUNITY
Start: 2022-10-11

## 2022-10-19 RX ORDER — SIMVASTATIN 20 MG/1
20 TABLET, FILM COATED ORAL
Qty: 90 | Refills: 0 | Status: ACTIVE | COMMUNITY
Start: 2022-06-28

## 2022-10-19 RX ORDER — DULOXETINE HYDROCHLORIDE 60 MG/1
60 CAPSULE, DELAYED RELEASE PELLETS ORAL
Qty: 180 | Refills: 0 | Status: ACTIVE | COMMUNITY
Start: 2022-01-29

## 2022-10-19 RX ORDER — NYSTATIN 100000 [USP'U]/G
100000 CREAM TOPICAL
Qty: 30 | Refills: 0 | Status: ACTIVE | COMMUNITY
Start: 2022-09-01

## 2022-10-19 RX ORDER — BROMPHENIRAMINE MALEATE, PSEUDOEPHEDRINE HYDROCHLORIDE, 2; 30; 10 MG/5ML; MG/5ML; MG/5ML
30-2-10 SYRUP ORAL
Qty: 100 | Refills: 0 | Status: ACTIVE | COMMUNITY
Start: 2022-09-30
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
## 2022-10-19 NOTE — ASSESSMENT
[FreeTextEntry1] : YOVANY DUNLAP is a 66 year old man with  BPH, cervical spine spondylosis, chronic lumbar radiculopathy, erectile dysfunction, GLADYS, diverticulitis with partial colectomy, CAD s/p PCI, HTN, and HLD, and anxiety is here as a new patient to establish his care for worsening leg weakness and falls. On exam has increased tone in the arms and legs and hyperreflexia. No significant muscles weakness except very mild right ankle dorsiflexion in the right, significant hyperreflexia with clonus, Garcia and Babinski and increased tone. Lumbar radiculopathy is chronic and I doubt that it would the reason for his recent falls. He had MRI L spine done last year in [place] and I will obtain the results. Possible differentials include SPS, MUSK, motor neurone disease and spine structural abnormalities. He reports chronic cough and lung nodule so paraneoplastic etiologies are on differentials. \par \par - Obtain records from MRI lumbar spine \par - Obtain Brain and cervical spine w/wo \par - MRI thoracic spine \par - EMG of two legs and one arm \par - Check labs for myomarkers, systemic rheumatologict disease, VGKC, paraneoplastic panel and DAD antibody.

## 2022-10-19 NOTE — PHYSICAL EXAM
[FreeTextEntry1] : Mental status: Awake, alert and oriented x3.  Recent and remote memory intact.  Naming, repetition and comprehension intact.  Attention/concentration intact.  No dysarthria, no aphasia.  Fund of knowledge appropriate.  \par Cranial nerves: Pupils equally round and reactive to light, visual fields full, no nystagmus, extraocular muscles intact, V1 through V3 intact bilaterally and symmetric, face symmetric, hearing intact to finger rub, palate elevation symmetric, tongue was midline.\par Motor:  MRC grading 5/5 b/l UE/LE.   strength 5/5. Slight 4+/5 weakness in right ankle dorsiflexion and eversion. No atrophy. No fasciculation. Spasticity in the arms worse in the right side. \par Sensation: Intact to light touch, proprioception, and pinprick. Reduced vibration in the left toe. \par Coordination: No dysmetria on finger-to-nose. \par Reflexes: 3+ in the arms and 4+ in the legs. Positive cross adductor and one beat of clonus in the right. Upgoing toes in the right  (+) Garcia.\par Gait: Spastic. Holding his arms flexed on walking.   Romberg negative\par \par

## 2022-10-20 NOTE — PROGRESS NOTE ADULT - SUBJECTIVE AND OBJECTIVE BOX
Prep ordered.    Alisa Morgan, [facility] October 20, 2022       SUBJECTIVE:    Patient is a 64 y/o Male who presents with a chief complaint of Chest pain (11 Oct 2019 16:27)    Currently admitted to medicine with the primary diagnosis of atypical chest pain.     Today is hospital day 1. Patient was seen and examined at bedside. This morning he is resting comfortably in bed and reports no new issues or overnight events. States his chest pain has completely resolved.     PAST MEDICAL & SURGICAL HISTORY  PAST MEDICAL & SURGICAL HISTORY:  Diverticulitis  Hypertension  High cholesterol  H/O colectomy  History of coronary artery bypass, five    SOCIAL HISTORY:  . Living with wife   Stopped smoking 13 years ago   No alcohol    ALLERGIES:  amiodarone (Unknown)  sulfa drugs (Unknown)    MEDICATIONS:  STANDING MEDICATIONS  clopidogrel Tablet 75 milliGRAM(s) Oral daily  diazepam    Tablet 10 milliGRAM(s) Oral daily  DULoxetine 60 milliGRAM(s) Oral at bedtime  enoxaparin Injectable 40 milliGRAM(s) SubCutaneous daily  hydrochlorothiazide 12.5 milliGRAM(s) Oral daily  niacin  milliGRAM(s) Oral at bedtime  ranolazine 500 milliGRAM(s) Oral two times a day  simvastatin 20 milliGRAM(s) Oral at bedtime    PRN MEDICATIONS    VITALS:   T(F): 96.3  HR: 54  BP: 140/70  RR: 18  SpO2: 98%    LABS:                        12.7   5.08  )-----------( 187      ( 11 Oct 2019 14:30 )             38.5     10-11    142  |  105  |  15  ----------------------------<  105<H>  4.3   |  25  |  1.0    Ca    9.4      11 Oct 2019 14:30    TPro  7.4  /  Alb  4.5  /  TBili  0.3  /  DBili  x   /  AST  19  /  ALT  14  /  AlkPhos  113  10-11          Creatine Kinase, Serum: 42 U/L (10-12-19 @ 00:50)  Troponin T, Serum: <0.01 ng/mL (10-12-19 @ 00:50)  Creatine Kinase, Serum: 46 U/L (10-11-19 @ 21:07)  Troponin T, Serum: <0.01 ng/mL (10-11-19 @ 21:07)  Troponin T, Serum: <0.01 ng/mL (10-11-19 @ 14:30)      CARDIAC MARKERS ( 12 Oct 2019 00:50 )  x     / <0.01 ng/mL / 42 U/L / x     / <1.0 ng/mL  CARDIAC MARKERS ( 11 Oct 2019 21:07 )  x     / <0.01 ng/mL / 46 U/L / x     / <1.0 ng/mL  CARDIAC MARKERS ( 11 Oct 2019 14:30 )  x     / <0.01 ng/mL / x     / x     / x            PHYSICAL EXAM:  GENERAL: NAD, speaks in full sentences, no signs of respiratory distress  HEAD: Atraumatic  NECK: Supple  CHEST/LUNG: Clear to auscultation bilaterally; No wheeze or crackles  HEART: S1, S2; RRR; No murmurs, rubs, or gallops  ABDOMEN: BS+; Soft, Non-tender, Non-distended  EXTREMITIES:  2+ Peripheral Pulses, No clubbing, cyanosis, or edema  PSYCH: AAOx3  NEUROLOGY: non-focal  SKIN: No rashes or lesions

## 2022-10-26 LAB
ALBUMIN MFR SERPL ELPH: 55.6 %
ALBUMIN SERPL-MCNC: 3.8 G/DL
ALBUMIN/GLOB SERPL: 1.2 RATIO
ALDOLASE SERPL-CCNC: 4.5 U/L
ALPHA1 GLOB MFR SERPL ELPH: 5.7 %
ALPHA1 GLOB SERPL ELPH-MCNC: 0.4 G/DL
ALPHA2 GLOB MFR SERPL ELPH: 14.4 %
ALPHA2 GLOB SERPL ELPH-MCNC: 1 G/DL
B-GLOBULIN MFR SERPL ELPH: 12.7 %
B-GLOBULIN SERPL ELPH-MCNC: 0.9 G/DL
CK SERPL-CCNC: 37 U/L
DEPRECATED KAPPA LC FREE/LAMBDA SER: 1.16 RATIO
DSDNA AB SER-ACNC: <12 IU/ML
FOLATE SERPL-MCNC: 6.5 NG/ML
GAMMA GLOB FLD ELPH-MCNC: 0.8 G/DL
GAMMA GLOB MFR SERPL ELPH: 11.6 %
IGA SER QL IEP: 303 MG/DL
IGG SER QL IEP: 777 MG/DL
IGM SER QL IEP: 62 MG/DL
INTERPRETATION SERPL IEP-IMP: NORMAL
KAPPA LC CSF-MCNC: 1.76 MG/DL
KAPPA LC SERPL-MCNC: 2.05 MG/DL
LDH SERPL-CCNC: 203 U/L
M PROTEIN SPEC IFE-MCNC: NORMAL
PROT SERPL-MCNC: 6.9 G/DL
PROT SERPL-MCNC: 6.9 G/DL
TSH SERPL-ACNC: 1.63 UIU/ML
VIT B12 SERPL-MCNC: 222 PG/ML

## 2022-10-31 LAB — PARANEOPLASTIC AB PNL SER: NORMAL

## 2022-11-03 ENCOUNTER — APPOINTMENT (OUTPATIENT)
Dept: NEUROLOGY | Facility: CLINIC | Age: 67
End: 2022-11-03

## 2022-11-03 ENCOUNTER — EMERGENCY (EMERGENCY)
Facility: HOSPITAL | Age: 67
LOS: 0 days | Discharge: HOME | End: 2022-11-03
Attending: EMERGENCY MEDICINE

## 2022-11-03 VITALS
SYSTOLIC BLOOD PRESSURE: 157 MMHG | TEMPERATURE: 96 F | DIASTOLIC BLOOD PRESSURE: 74 MMHG | OXYGEN SATURATION: 96 % | RESPIRATION RATE: 20 BRPM | HEIGHT: 68 IN | WEIGHT: 214.95 LBS | HEART RATE: 62 BPM

## 2022-11-03 VITALS
SYSTOLIC BLOOD PRESSURE: 152 MMHG | DIASTOLIC BLOOD PRESSURE: 72 MMHG | OXYGEN SATURATION: 98 % | RESPIRATION RATE: 20 BRPM | HEART RATE: 68 BPM

## 2022-11-03 DIAGNOSIS — Z95.1 PRESENCE OF AORTOCORONARY BYPASS GRAFT: Chronic | ICD-10-CM

## 2022-11-03 DIAGNOSIS — M48.00 SPINAL STENOSIS, SITE UNSPECIFIED: ICD-10-CM

## 2022-11-03 DIAGNOSIS — Z90.49 ACQUIRED ABSENCE OF OTHER SPECIFIED PARTS OF DIGESTIVE TRACT: Chronic | ICD-10-CM

## 2022-11-03 DIAGNOSIS — R76.9 ABNORMAL IMMUNOLOGICAL FINDING IN SERUM, UNSPECIFIED: ICD-10-CM

## 2022-11-03 DIAGNOSIS — R27.0 ATAXIA, UNSPECIFIED: ICD-10-CM

## 2022-11-03 LAB
ANION GAP SERPL CALC-SCNC: 11 MMOL/L — SIGNIFICANT CHANGE UP (ref 7–14)
APTT BLD: 34.7 SEC — SIGNIFICANT CHANGE UP (ref 27–39.2)
BASOPHILS # BLD AUTO: 0.02 K/UL — SIGNIFICANT CHANGE UP (ref 0–0.2)
BASOPHILS NFR BLD AUTO: 0.3 % — SIGNIFICANT CHANGE UP (ref 0–1)
BLD GP AB SCN SERPL QL: SIGNIFICANT CHANGE UP
BUN SERPL-MCNC: 12 MG/DL — SIGNIFICANT CHANGE UP (ref 10–20)
CALCIUM SERPL-MCNC: 9.7 MG/DL — SIGNIFICANT CHANGE UP (ref 8.4–10.5)
CHLORIDE SERPL-SCNC: 100 MMOL/L — SIGNIFICANT CHANGE UP (ref 98–110)
CO2 SERPL-SCNC: 26 MMOL/L — SIGNIFICANT CHANGE UP (ref 17–32)
CREAT SERPL-MCNC: 0.9 MG/DL — SIGNIFICANT CHANGE UP (ref 0.7–1.5)
EGFR: 93 ML/MIN/1.73M2 — SIGNIFICANT CHANGE UP
EOSINOPHIL # BLD AUTO: 0.06 K/UL — SIGNIFICANT CHANGE UP (ref 0–0.7)
EOSINOPHIL NFR BLD AUTO: 0.9 % — SIGNIFICANT CHANGE UP (ref 0–8)
GLUCOSE SERPL-MCNC: 100 MG/DL — HIGH (ref 70–99)
HCT VFR BLD CALC: 35.7 % — LOW (ref 42–52)
HGB BLD-MCNC: 11.8 G/DL — LOW (ref 14–18)
IMM GRANULOCYTES NFR BLD AUTO: 1.2 % — HIGH (ref 0.1–0.3)
INR BLD: 1.03 RATIO — SIGNIFICANT CHANGE UP (ref 0.65–1.3)
LYMPHOCYTES # BLD AUTO: 2.46 K/UL — SIGNIFICANT CHANGE UP (ref 1.2–3.4)
LYMPHOCYTES # BLD AUTO: 37.4 % — SIGNIFICANT CHANGE UP (ref 20.5–51.1)
MCHC RBC-ENTMCNC: 28.7 PG — SIGNIFICANT CHANGE UP (ref 27–31)
MCHC RBC-ENTMCNC: 33.1 G/DL — SIGNIFICANT CHANGE UP (ref 32–37)
MCV RBC AUTO: 86.9 FL — SIGNIFICANT CHANGE UP (ref 80–94)
MONOCYTES # BLD AUTO: 0.75 K/UL — HIGH (ref 0.1–0.6)
MONOCYTES NFR BLD AUTO: 11.4 % — HIGH (ref 1.7–9.3)
NEUTROPHILS # BLD AUTO: 3.2 K/UL — SIGNIFICANT CHANGE UP (ref 1.4–6.5)
NEUTROPHILS NFR BLD AUTO: 48.8 % — SIGNIFICANT CHANGE UP (ref 42.2–75.2)
NRBC # BLD: 0 /100 WBCS — SIGNIFICANT CHANGE UP (ref 0–0)
PLATELET # BLD AUTO: 244 K/UL — SIGNIFICANT CHANGE UP (ref 130–400)
POTASSIUM SERPL-MCNC: 4.1 MMOL/L — SIGNIFICANT CHANGE UP (ref 3.5–5)
POTASSIUM SERPL-SCNC: 4.1 MMOL/L — SIGNIFICANT CHANGE UP (ref 3.5–5)
PROTHROM AB SERPL-ACNC: 11.8 SEC — SIGNIFICANT CHANGE UP (ref 9.95–12.87)
RBC # BLD: 4.11 M/UL — LOW (ref 4.7–6.1)
RBC # FLD: 12.8 % — SIGNIFICANT CHANGE UP (ref 11.5–14.5)
SARS-COV-2 RNA SPEC QL NAA+PROBE: SIGNIFICANT CHANGE UP
SODIUM SERPL-SCNC: 137 MMOL/L — SIGNIFICANT CHANGE UP (ref 135–146)
WBC # BLD: 6.57 K/UL — SIGNIFICANT CHANGE UP (ref 4.8–10.8)
WBC # FLD AUTO: 6.57 K/UL — SIGNIFICANT CHANGE UP (ref 4.8–10.8)

## 2022-11-03 PROCEDURE — 99285 EMERGENCY DEPT VISIT HI MDM: CPT

## 2022-11-03 PROCEDURE — 95886 MUSC TEST DONE W/N TEST COMP: CPT

## 2022-11-03 PROCEDURE — 73590 X-RAY EXAM OF LOWER LEG: CPT | Mod: 26,50

## 2022-11-03 PROCEDURE — 73630 X-RAY EXAM OF FOOT: CPT | Mod: 26,RT

## 2022-11-03 PROCEDURE — 93970 EXTREMITY STUDY: CPT | Mod: 26

## 2022-11-03 PROCEDURE — 95912 NRV CNDJ TEST 11-12 STUDIES: CPT

## 2022-11-03 PROCEDURE — 99213 OFFICE O/P EST LOW 20 MIN: CPT

## 2022-11-03 PROCEDURE — 73610 X-RAY EXAM OF ANKLE: CPT | Mod: 26,RT

## 2022-11-03 NOTE — HISTORY OF PRESENT ILLNESS
[FreeTextEntry1] : YOVANY DUNLAP is a 66 year old man is here as a follow up visit for worsening ataxia and falls  and EMG study. He was last seen on October 19th. \par He has medical history of BPH, cervical spine spondylosis, erectile dysfunction, GLADYS, diverticulitis with partial colectomy (2 feet) in 1998, CAD s/p PCI to the LAD then quadruple bypass complicated by staph infection. \par Since last visit concerned for evidence of spasticity on exam Brain MRI, C and T spine MRIs ordered and were done. Brain MRI showed atrophy and slight asymmetry in right parietal lobe and white matter changes. MRI C and T spine showed mild degenerative changes with no cord abnormal signals. MRI L spine reviews which showed multilevel degenerative changes bulging disc and foraminal narrowing in bilateral L4 and L5 nerve roots. Lab work up shows low B12 level and slight increase in [place] light chain.

## 2022-11-03 NOTE — ED ADULT TRIAGE NOTE - IDEAL BODY WEIGHT(KG)
Detail Level: Detailed
66
Implemented All Universal Safety Interventions:  [place] to call system. Call bell, personal items and telephone within reach. Instruct patient to call for assistance. Room bathroom lighting operational. Non-slip footwear when patient is off stretcher. Physically safe environment: no spills, clutter or unnecessary equipment. Stretcher in lowest position, wheels locked, appropriate side rails in place.

## 2022-11-03 NOTE — ED PROVIDER NOTE - CLINICAL SUMMARY MEDICAL DECISION MAKING FREE TEXT BOX
Patient with bilateral leg swelling duplex negative screening labs unremarkable.  Patient stable for discharge.

## 2022-11-03 NOTE — ED PROVIDER NOTE - NSFOLLOWUPINSTRUCTIONS_ED_ALL_ED_FT
- Please follow up with your Primary Care Physician and the vascular surgeons    Leg Pain    WHAT YOU NEED TO KNOW:  Leg pain may be caused by a variety of health conditions. Your tests did not show any broken bones or blood clots.    DISCHARGE INSTRUCTIONS:  Return to the emergency department if:   You have a fever.  Your leg starts to swell.  Your leg pain gets worse.  You have numbness or tingling in your leg or toes.  You cannot put any weight on or move your leg.    Contact your healthcare provider if:   Your pain does not decrease, even after treatment.  You have questions or concerns about your condition or care.    Medicines:   NSAIDs, such as ibuprofen, help decrease swelling, pain, and fever. This medicine is available with or without a doctor's order. NSAIDs can cause stomach bleeding or kidney problems in certain people. If you take blood thinner medicine, always ask your healthcare provider if NSAIDs are safe for you. Always read the medicine label and follow directions.    Take your medicine as directed. Contact your healthcare provider if you think your medicine is not helping or if you have side effects. Tell him of her if you are allergic to any medicine. Keep a list of the medicines, vitamins, and herbs you take. Include the amounts, and when and why you take them. Bring the list or the pill bottles to follow-up visits. Carry your medicine list with you in case of an emergency.    Follow up with your healthcare provider as directed: You may need more tests to find the cause of your leg pain. You may need to see an orthopedic specialist or a physical therapist. Write down your questions so you remember to ask them during your visits.    Manage your leg pain:   Rest your injured leg so that it can heal. You may need an immobilizer, brace, or splint to limit the movement of your leg. You may need to avoid putting any weight on your leg for at least 48 hours. Return to normal activities as directed.    Ice the injury for 20 minutes every 4 hours for up to 24 hours, or as directed. Use an ice pack, or put crushed ice in a plastic bag. Cover it with a towel to protect your skin. Ice helps prevent tissue damage and decreases swelling and pain.    Elevate your injured leg above the level of your heart as often as you can. This will help decrease swelling and pain. If possible, prop your leg on pillows or blankets to keep the area elevated comfortably.     Use assistive devices as directed. You may need to use a cane or crutches. Assistive devices help decrease pain and pressure on your leg when you walk. Ask your healthcare provider for more information about assistive devices and how to use them correctly.    Maintain a healthy weight. Extra body weight can cause pressure and pain in your hip, knee, and ankle joints. Ask your healthcare provider how much you should weigh. Ask him to help you create a weight loss plan if you are overweight.

## 2022-11-03 NOTE — ED PROVIDER NOTE - ATTENDING CONTRIBUTION TO CARE
66-year-old male with a history as above here for evaluation of bilateral leg pain and swelling.  Patient was adamant outpatient physicians office sent in here for evaluation of the bilateral swelling.  Patient has no chest pain shortness of breath.  Agree with above exam.  Impression  Patient with bilateral leg swelling duplex negative screening labs unremarkable.  Patient stable for discharge.

## 2022-11-03 NOTE — PHYSICAL EXAM
[FreeTextEntry1] : Mental status: Awake, alert and oriented x3. Recent and remote memory intact. Naming, repetition and comprehension intact. Attention/concentration intact. No dysarthria, no aphasia. Fund of knowledge appropriate. \par Cranial nerves: Pupils equally round and reactive to light, visual fields full, no nystagmus, extraocular muscles intact, V1 through V3 intact bilaterally and symmetric, face symmetric, hearing intact to finger rub, palate elevation symmetric, tongue was midline.\par Motor: MRC grading 5/5 b/l UE/LE.  strength 5/5. Slight 4+/5 weakness in right ankle dorsiflexion and eversion. No atrophy. No fasciculation. No significant spasticity in the arms today \par Sensation: Intact to light touch, proprioception, and pinprick. Reduced vibration in the toes \par Coordination: No dysmetria on finger-to-nose. \par Reflexes: 3+ in the arms and 4+ in the legs. Positive cross adductor and one beat of clonus in the right. Upgoing toes in the right (+) Garcia.\par Gait: Romberg negative

## 2022-11-03 NOTE — ED PROVIDER NOTE - PROGRESS NOTE DETAILS
AH - X-rays reviewed, ultrasound negative for DVT bilaterally.  Pulses are palpable and dopplerable.  Patient will follow-up outpatient.  No chest pain or shortness of breath. Patient to be discharged from ED. Any available test results were discussed with patient and/or family. Verbal instructions given, including instructions to return to ED immediately for any new, worsening, or concerning symptoms. Strict return precautions given. Written discharge instructions additionally given, including follow-up plan

## 2022-11-03 NOTE — ED PROVIDER NOTE - NS ED ROS FT
Review of Systems:  CONSTITUTIONAL: No fever, No diaphoresis, No generalized weakness  SKIN: No rash  HEMATOLOGIC: No abnormal bleeding or bruising  EYES: No eye pain, No blurred vision  ENT: No change in hearing, No sore throat, No neck pain, No rhinorrhea, No ear pain  RESPIRATORY: No shortness of breath, No cough  CARDIAC: No chest pain, No palpitations  GI: No abdominal pain, No nausea, No vomiting, No diarrhea, No constipation, No bright red blood per rectum, No melena. No flank pain  MUSCULOSKELETAL: No joint paint, +leg pain, + swelling, No back pain  NEUROLOGIC: No numbness, No focal weakness, No headache, No dizziness  All other systems negative, unless specified in HPI

## 2022-11-03 NOTE — ASSESSMENT
[FreeTextEntry1] :  66 year old man with BPH, cervical spine spondylosis, chronic lumbar radiculopathy, erectile dysfunction, GLADYS, diverticulitis with partial colectomy, CAD s/p PCI, HTN, and HLD, and anxiety is here as a follow up visit for EMG test and falls. In the last visit some increased tone were noted on arms and legs and hyperreflexia with  significant muscles weakness except very mild right ankle dorsiflexion in the right, significant hyperreflexia with clonus, Garcia and Babinski and increased tone. Brain MRI showed mild asymmetric atrophy and cervical and thoracic MRI showed no cord signal changes. On exam today no significant spasticity was noted. Hyperreflexia still present. EMG test showed no evidence of motor neurone disease, mild carpal tunnel in the left, chronic right L4-S1 radiculopathy and possible very mild sensory neuropathy in the leg. All the labs including myomarkers, systemic rheumatologic disease, VGKC and paraneoplastic panel were negative. Per wife he has had habit of holding his arms flexed on walking which could be observed in all his family members. Given the EMG and MRI findings, motor neuron disease, UMN lesion and myositis is less likely. Mild peripheral neuropathy is still on differentials given the low B12 level and positive Kappa light chain for which I would refer him to hematologist and PCP for B12 supplement and to r/o multiple myeloma. He reports worsening pain in the right leg and positional back pain. Putting MRI and EMG together, would refer him to neurosurgeon and PT for further evaluation. \par \par

## 2022-11-03 NOTE — ED PROVIDER NOTE - PHYSICAL EXAMINATION
CONSTITUTIONAL: in no acute distress, afebrile  SKIN: Warm, dry  HEAD: Normocephalic; atraumatic  EYES: No conjunctival injection. EOMI  ENT: No nasal discharge; oropharynx nonerythematous; airway clear  CARD:  Regular rate and rhythm  RESP: CTAB; No wheezes, crackles, rales or rhonchi  ABD: Soft NTND; No guarding or rebound tenderness  EXT: Normal ROM.  No clubbing or cyanosis.  bilateral swelling and pain to distal LE, blanching, full ROM, mild calf tenderness, ankle and tib fib TTP bilaterally, neurovascularly in tact, dopplerable DP pulses bilaterally  NEURO: A&O x3, grossly unremarkable, no focal deficits  *Chaperone MD Delarosa was used during the encounter. A professional environment was maintained and discussed with patient

## 2022-11-03 NOTE — ED PROVIDER NOTE - PATIENT PORTAL LINK FT
You can access the FollowMyHealth Patient Portal offered by [facility] by registering at the following website: [email or web address]. By joining [employer]’s FollowMyHealth portal, you will also be able to view your health information using other applications (apps) compatible with our system.

## 2022-11-03 NOTE — ED PROVIDER NOTE - CARE PROVIDER_API CALL
Osvaldo Hammonds)  Surgery; Vascular Surgery  [address] [place] [address]  Phone: [phone]  Fax: [phone]  Follow Up Time: 1-3 Days

## 2022-11-03 NOTE — ED PROVIDER NOTE - OBJECTIVE STATEMENT
66-year-old male with PMH of CABG x4, HTN, chronic back pain, HLD who presents with bilateral leg swelling and pain.  Patient was at outpatient neurologist Dr. Ocampo office for EMG when he noticed swelling to both legs and sent to ER.  Pain has been able to ambulate.  Not on anticoagulation.  No history of DVT, PE.  Patient denies any trauma, falls, fevers, chills, chest pain, shortness of breath, nausea, vomiting, numbness, tingling, focal weakness.

## 2023-01-16 ENCOUNTER — NON-APPOINTMENT (OUTPATIENT)
Age: 68
End: 2023-01-16

## 2023-04-25 NOTE — ED PROVIDER NOTE - ATTENDING CONTRIBUTION TO CARE
Additional Area 1 Units: 0 Pt with history of CAD s/p cabg presents with one month of chest discomfort, intermittent.  pt saw dr das last week and was insturcted to come to ED if persistent symptoms.  pt has also had chronic abd discomfort for over a month and had CT scan done a few weeks ago that was negative- pt was ifnormed of nodule and already followed up with his doctor.  no vomiting, no fevers, no chills.  no back pain.    awake, alert.  abd soft, nontender, benign.  mmm.  lungs clear.    p: labs, ekg, cxr,  pt is on plavix- will give plavix and admit to tele.

## 2023-04-26 ENCOUNTER — APPOINTMENT (OUTPATIENT)
Dept: NEUROLOGY | Facility: CLINIC | Age: 68
End: 2023-04-26

## 2023-05-24 ENCOUNTER — OUTPATIENT (OUTPATIENT)
Dept: OUTPATIENT SERVICES | Facility: HOSPITAL | Age: 68
LOS: 1 days | End: 2023-05-24
Payer: MEDICARE

## 2023-05-24 DIAGNOSIS — R22.9 LOCALIZED SWELLING, MASS AND LUMP, UNSPECIFIED: ICD-10-CM

## 2023-05-24 DIAGNOSIS — Z90.49 ACQUIRED ABSENCE OF OTHER SPECIFIED PARTS OF DIGESTIVE TRACT: Chronic | ICD-10-CM

## 2023-05-24 DIAGNOSIS — Z95.1 PRESENCE OF AORTOCORONARY BYPASS GRAFT: Chronic | ICD-10-CM

## 2023-05-24 DIAGNOSIS — Z00.8 ENCOUNTER FOR OTHER GENERAL EXAMINATION: ICD-10-CM

## 2023-05-24 PROCEDURE — 93970 EXTREMITY STUDY: CPT | Mod: 26

## 2023-05-24 PROCEDURE — 93970 EXTREMITY STUDY: CPT

## 2023-05-25 DIAGNOSIS — R22.9 LOCALIZED SWELLING, MASS AND LUMP, UNSPECIFIED: ICD-10-CM

## 2023-05-31 ENCOUNTER — EMERGENCY (EMERGENCY)
Facility: HOSPITAL | Age: 68
LOS: 0 days | Discharge: HOME | End: 2023-05-31
Attending: EMERGENCY MEDICINE
Payer: MEDICARE

## 2023-05-31 VITALS
OXYGEN SATURATION: 98 % | RESPIRATION RATE: 18 BRPM | HEART RATE: 62 BPM | DIASTOLIC BLOOD PRESSURE: 60 MMHG | TEMPERATURE: 99 F | SYSTOLIC BLOOD PRESSURE: 126 MMHG

## 2023-05-31 VITALS
OXYGEN SATURATION: 100 % | TEMPERATURE: 99 F | HEIGHT: 68 IN | SYSTOLIC BLOOD PRESSURE: 109 MMHG | WEIGHT: 199.96 LBS | RESPIRATION RATE: 16 BRPM | DIASTOLIC BLOOD PRESSURE: 54 MMHG | HEART RATE: 68 BPM

## 2023-05-31 DIAGNOSIS — Z87.19 PERSONAL HISTORY OF OTHER DISEASES OF THE DIGESTIVE SYSTEM: ICD-10-CM

## 2023-05-31 DIAGNOSIS — R05.9 COUGH, UNSPECIFIED: ICD-10-CM

## 2023-05-31 DIAGNOSIS — I10 ESSENTIAL (PRIMARY) HYPERTENSION: ICD-10-CM

## 2023-05-31 DIAGNOSIS — E78.00 PURE HYPERCHOLESTEROLEMIA, UNSPECIFIED: ICD-10-CM

## 2023-05-31 DIAGNOSIS — Z95.1 PRESENCE OF AORTOCORONARY BYPASS GRAFT: ICD-10-CM

## 2023-05-31 DIAGNOSIS — I73.9 PERIPHERAL VASCULAR DISEASE, UNSPECIFIED: ICD-10-CM

## 2023-05-31 DIAGNOSIS — Z88.2 ALLERGY STATUS TO SULFONAMIDES: ICD-10-CM

## 2023-05-31 DIAGNOSIS — Z79.02 LONG TERM (CURRENT) USE OF ANTITHROMBOTICS/ANTIPLATELETS: ICD-10-CM

## 2023-05-31 DIAGNOSIS — Z88.8 ALLERGY STATUS TO OTHER DRUGS, MEDICAMENTS AND BIOLOGICAL SUBSTANCES: ICD-10-CM

## 2023-05-31 DIAGNOSIS — Z90.49 ACQUIRED ABSENCE OF OTHER SPECIFIED PARTS OF DIGESTIVE TRACT: Chronic | ICD-10-CM

## 2023-05-31 DIAGNOSIS — L03.115 CELLULITIS OF RIGHT LOWER LIMB: ICD-10-CM

## 2023-05-31 DIAGNOSIS — M79.89 OTHER SPECIFIED SOFT TISSUE DISORDERS: ICD-10-CM

## 2023-05-31 DIAGNOSIS — Z95.1 PRESENCE OF AORTOCORONARY BYPASS GRAFT: Chronic | ICD-10-CM

## 2023-05-31 DIAGNOSIS — Z90.49 ACQUIRED ABSENCE OF OTHER SPECIFIED PARTS OF DIGESTIVE TRACT: ICD-10-CM

## 2023-05-31 PROCEDURE — 71046 X-RAY EXAM CHEST 2 VIEWS: CPT

## 2023-05-31 PROCEDURE — 99284 EMERGENCY DEPT VISIT MOD MDM: CPT

## 2023-05-31 PROCEDURE — 93925 LOWER EXTREMITY STUDY: CPT | Mod: 26

## 2023-05-31 PROCEDURE — 99284 EMERGENCY DEPT VISIT MOD MDM: CPT | Mod: 25

## 2023-05-31 PROCEDURE — 71046 X-RAY EXAM CHEST 2 VIEWS: CPT | Mod: 26

## 2023-05-31 PROCEDURE — 93925 LOWER EXTREMITY STUDY: CPT

## 2023-05-31 RX ORDER — CEPHALEXIN 500 MG
1 CAPSULE ORAL
Qty: 28 | Refills: 0
Start: 2023-05-31 | End: 2023-06-06

## 2023-05-31 NOTE — ED PROVIDER NOTE - CLINICAL SUMMARY MEDICAL DECISION MAKING FREE TEXT BOX
Patient presented with atraumatic right lower extremity pain and swelling over the past few days.  Per patient's wife at bedside, patient also had discoloration of his toes yesterday which is since resolved.  Patient also complaining of cough times several weeks.  Otherwise on arrival patient afebrile, hemodynamically stable, lungs clear, normal O2 saturation on room air.  Exam of legs showed mild swelling of the right lower extremity compared to the left, with no overlying skin changes, overall neurovascularly intact.  No evidence of infection, compartments soft, no crepitus. Patient had DVT study performed already which was negative for DVT.  Arterial duplex subsequently obtained for reported discoloration of the toes and this was unremarkable.  In terms of cough, chest xra obtained and negative for pneumothorax, pneumonia, widened mediastinum, evidence of rib fractures, enlarged cardiac silhouette or any other emergent pathologies.  Patient ambulatory in ED without desaturation, tolerating p.o.  Given the above, will discharge home with outpatient follow up. Patient agreeable with plan. Agrees to return to ED for any new or worsening symptoms.

## 2023-05-31 NOTE — ED PROVIDER NOTE - OBJECTIVE STATEMENT
Pt is a 67 year old male with PMH noted in chart presents to ED with complaints of R lower extremity and swelling. Pt states pain is atraumatic started hurting few days prior, went to PCP sent for out pt work up for DVT. Pt states has not received results as of yet, however results and images pulled up on PACS, negative. pain is mild-moderate, non radiating with no alleviating factors. Hollis fever, chills, bodyaches, chest pain, sob, abdominal pain, NVCD

## 2023-05-31 NOTE — ED ADULT NURSE NOTE - NSFALLRISKFACTORS_ED_ALL_ED
Detail Level: Zone
Recommendation Preamble: The following recommendations were made during the visit: Juvaderm Ultra lips marionettes, lips, Volluma cheeks x 2
No indicators present

## 2023-05-31 NOTE — ED PROVIDER NOTE - PHYSICAL EXAMINATION
Physical Exam    Vital Signs: I have reviewed the initial vital signs.  Constitutional: well-nourished, appears stated age, no acute distress  Eyes: Conjunctiva pink, Sclera clear, PERRLA, EOMI.  Musculoskeletal: supple neck, R lower extremity edema with mild erythema, no streaking, no midline tenderness. no andrew tenderness. 5/5 strength, no sensory def and distal pulses intact   Integumentary: warm, dry, no rash  Neurologic: awake, alert, cranial nerves II-XII grossly intact, extremities’ motor and sensory functions grossly intact  Psychiatric: appropriate mood, appropriate affect

## 2023-05-31 NOTE — ED PROVIDER NOTE - NSFOLLOWUPINSTRUCTIONS_ED_ALL_ED_FT
Follow up with your PCP in 1-2 days if redness gets worse return to ED    Cellulitis    Cellulitis is a skin infection caused by bacteria. This condition occurs most often in the arms and lower legs but can occur anywhere over the body. Symptoms include redness, swelling, warm skin, tenderness, and chills/fever. If you were prescribed an antibiotic medicine, take it as told by your health care provider. Do not stop taking the antibiotic even if you start to feel better.    SEEK IMMEDIATE MEDICAL CARE IF YOU HAVE THE FOLLOWING SYMPTOMS: worsening fever, red streaks coming from affected area, vomiting or diarrhea, or dizziness/lightheadedness.

## 2023-05-31 NOTE — ED PROVIDER NOTE - NS ED ATTENDING STATEMENT MOD
This was a shared visit with the YARI. I reviewed and verified the documentation and independently performed the documented:

## 2023-05-31 NOTE — ED PROVIDER NOTE - DIFFERENTIAL DIAGNOSIS
RLE swelling. Also with persistent cough as documented. Will patient with recent DVT study that was negative but per wife patient's foot was a purplish color this AM - will r/o arterial pathology. Will also r/o PNA for cough Differential Diagnosis

## 2023-05-31 NOTE — ED PROVIDER NOTE - PATIENT PORTAL LINK FT
You can access the FollowMyHealth Patient Portal offered by [facility] by registering at the following website: [email or web address]. By joining [employer]’s FollowMyHealth portal, you will also be able to view your health information using other applications (apps) compatible with our system.

## 2023-06-09 ENCOUNTER — APPOINTMENT (OUTPATIENT)
Dept: VASCULAR SURGERY | Facility: CLINIC | Age: 68
End: 2023-06-09
Payer: MEDICARE

## 2023-06-09 VITALS
WEIGHT: 200 LBS | HEIGHT: 68 IN | DIASTOLIC BLOOD PRESSURE: 63 MMHG | SYSTOLIC BLOOD PRESSURE: 109 MMHG | BODY MASS INDEX: 30.31 KG/M2

## 2023-06-09 DIAGNOSIS — M79.89 PAIN IN RIGHT LOWER LEG: ICD-10-CM

## 2023-06-09 DIAGNOSIS — L03.115 CELLULITIS OF RIGHT LOWER LIMB: ICD-10-CM

## 2023-06-09 DIAGNOSIS — M79.661 PAIN IN RIGHT LOWER LEG: ICD-10-CM

## 2023-06-09 DIAGNOSIS — I87.2 VENOUS INSUFFICIENCY (CHRONIC) (PERIPHERAL): ICD-10-CM

## 2023-06-09 PROCEDURE — 93971 EXTREMITY STUDY: CPT

## 2023-06-09 PROCEDURE — 99203 OFFICE O/P NEW LOW 30 MIN: CPT

## 2023-06-16 ENCOUNTER — APPOINTMENT (OUTPATIENT)
Dept: NEUROLOGY | Facility: CLINIC | Age: 68
End: 2023-06-16

## 2023-07-24 ENCOUNTER — OUTPATIENT (OUTPATIENT)
Dept: OUTPATIENT SERVICES | Facility: HOSPITAL | Age: 68
LOS: 1 days | End: 2023-07-24
Payer: MEDICARE

## 2023-07-24 DIAGNOSIS — Z90.49 ACQUIRED ABSENCE OF OTHER SPECIFIED PARTS OF DIGESTIVE TRACT: Chronic | ICD-10-CM

## 2023-07-24 DIAGNOSIS — M25.571 PAIN IN RIGHT ANKLE AND JOINTS OF RIGHT FOOT: ICD-10-CM

## 2023-07-24 DIAGNOSIS — Z95.1 PRESENCE OF AORTOCORONARY BYPASS GRAFT: Chronic | ICD-10-CM

## 2023-07-24 DIAGNOSIS — M79.671 PAIN IN RIGHT FOOT: ICD-10-CM

## 2023-07-24 PROCEDURE — 73610 X-RAY EXAM OF ANKLE: CPT | Mod: RT

## 2023-07-24 PROCEDURE — 73610 X-RAY EXAM OF ANKLE: CPT | Mod: 26,RT

## 2023-07-24 PROCEDURE — 73630 X-RAY EXAM OF FOOT: CPT | Mod: 26,RT

## 2023-07-24 PROCEDURE — 73630 X-RAY EXAM OF FOOT: CPT | Mod: RT

## 2023-07-25 DIAGNOSIS — M25.571 PAIN IN RIGHT ANKLE AND JOINTS OF RIGHT FOOT: ICD-10-CM

## 2023-07-25 DIAGNOSIS — M79.671 PAIN IN RIGHT FOOT: ICD-10-CM

## 2023-07-27 ENCOUNTER — OUTPATIENT (OUTPATIENT)
Dept: OUTPATIENT SERVICES | Facility: HOSPITAL | Age: 68
LOS: 1 days | End: 2023-07-27
Payer: MEDICARE

## 2023-07-27 DIAGNOSIS — M79.671 PAIN IN RIGHT FOOT: ICD-10-CM

## 2023-07-27 DIAGNOSIS — Z00.8 ENCOUNTER FOR OTHER GENERAL EXAMINATION: ICD-10-CM

## 2023-07-27 DIAGNOSIS — Z90.49 ACQUIRED ABSENCE OF OTHER SPECIFIED PARTS OF DIGESTIVE TRACT: Chronic | ICD-10-CM

## 2023-07-27 DIAGNOSIS — Z95.1 PRESENCE OF AORTOCORONARY BYPASS GRAFT: Chronic | ICD-10-CM

## 2023-07-27 PROCEDURE — 73718 MRI LOWER EXTREMITY W/O DYE: CPT | Mod: 26,RT

## 2023-07-27 PROCEDURE — 73718 MRI LOWER EXTREMITY W/O DYE: CPT | Mod: RT

## 2023-07-28 DIAGNOSIS — M79.671 PAIN IN RIGHT FOOT: ICD-10-CM

## 2023-10-12 ENCOUNTER — APPOINTMENT (OUTPATIENT)
Dept: NEUROSURGERY | Facility: CLINIC | Age: 68
End: 2023-10-12

## 2023-10-26 ENCOUNTER — OUTPATIENT (OUTPATIENT)
Dept: OUTPATIENT SERVICES | Facility: HOSPITAL | Age: 68
LOS: 1 days | End: 2023-10-26
Payer: MEDICARE

## 2023-10-26 DIAGNOSIS — R05.9 COUGH, UNSPECIFIED: ICD-10-CM

## 2023-10-26 DIAGNOSIS — Z95.1 PRESENCE OF AORTOCORONARY BYPASS GRAFT: Chronic | ICD-10-CM

## 2023-10-26 DIAGNOSIS — Z90.49 ACQUIRED ABSENCE OF OTHER SPECIFIED PARTS OF DIGESTIVE TRACT: Chronic | ICD-10-CM

## 2023-10-26 PROCEDURE — 71046 X-RAY EXAM CHEST 2 VIEWS: CPT

## 2023-10-26 PROCEDURE — 71046 X-RAY EXAM CHEST 2 VIEWS: CPT | Mod: 26

## 2023-10-27 DIAGNOSIS — R05.9 COUGH, UNSPECIFIED: ICD-10-CM

## 2023-12-08 ENCOUNTER — NON-APPOINTMENT (OUTPATIENT)
Age: 68
End: 2023-12-08

## 2023-12-08 DIAGNOSIS — R41.844 FRONTAL LOBE AND EXECUTIVE FUNCTION DEFICIT: ICD-10-CM

## 2023-12-08 DIAGNOSIS — R25.9 UNSPECIFIED ABNORMAL INVOLUNTARY MOVEMENTS: ICD-10-CM

## 2023-12-08 DIAGNOSIS — Z81.8 FAMILY HISTORY OF OTHER MENTAL AND BEHAVIORAL DISORDERS: ICD-10-CM

## 2024-03-26 ENCOUNTER — NON-APPOINTMENT (OUTPATIENT)
Age: 69
End: 2024-03-26

## 2024-07-07 ENCOUNTER — EMERGENCY (EMERGENCY)
Facility: HOSPITAL | Age: 69
LOS: 0 days | Discharge: HOME | End: 2024-07-07
Attending: STUDENT IN AN ORGANIZED HEALTH CARE EDUCATION/TRAINING PROGRAM
Payer: MEDICARE

## 2024-07-07 VITALS
SYSTOLIC BLOOD PRESSURE: 167 MMHG | HEIGHT: 68 IN | DIASTOLIC BLOOD PRESSURE: 66 MMHG | RESPIRATION RATE: 18 BRPM | OXYGEN SATURATION: 97 % | HEART RATE: 58 BPM | WEIGHT: 214.95 LBS | TEMPERATURE: 99 F

## 2024-07-07 DIAGNOSIS — Z88.2 ALLERGY STATUS TO SULFONAMIDES: ICD-10-CM

## 2024-07-07 DIAGNOSIS — M25.511 PAIN IN RIGHT SHOULDER: ICD-10-CM

## 2024-07-07 DIAGNOSIS — M79.631 PAIN IN RIGHT FOREARM: ICD-10-CM

## 2024-07-07 DIAGNOSIS — M54.50 LOW BACK PAIN, UNSPECIFIED: ICD-10-CM

## 2024-07-07 DIAGNOSIS — Z88.8 ALLERGY STATUS TO OTHER DRUGS, MEDICAMENTS AND BIOLOGICAL SUBSTANCES: ICD-10-CM

## 2024-07-07 DIAGNOSIS — Z90.49 ACQUIRED ABSENCE OF OTHER SPECIFIED PARTS OF DIGESTIVE TRACT: Chronic | ICD-10-CM

## 2024-07-07 DIAGNOSIS — G89.29 OTHER CHRONIC PAIN: ICD-10-CM

## 2024-07-07 DIAGNOSIS — E78.5 HYPERLIPIDEMIA, UNSPECIFIED: ICD-10-CM

## 2024-07-07 DIAGNOSIS — I10 ESSENTIAL (PRIMARY) HYPERTENSION: ICD-10-CM

## 2024-07-07 DIAGNOSIS — Z95.1 PRESENCE OF AORTOCORONARY BYPASS GRAFT: Chronic | ICD-10-CM

## 2024-07-07 PROCEDURE — 93010 ELECTROCARDIOGRAM REPORT: CPT

## 2024-07-07 PROCEDURE — 93005 ELECTROCARDIOGRAM TRACING: CPT

## 2024-07-07 PROCEDURE — 99283 EMERGENCY DEPT VISIT LOW MDM: CPT | Mod: 25

## 2024-07-07 PROCEDURE — 96372 THER/PROPH/DIAG INJ SC/IM: CPT

## 2024-07-07 PROCEDURE — 99284 EMERGENCY DEPT VISIT MOD MDM: CPT

## 2024-07-07 RX ORDER — METHOCARBAMOL 500 MG
2 TABLET ORAL
Qty: 30 | Refills: 0
Start: 2024-07-07 | End: 2024-07-11

## 2024-07-07 RX ORDER — METHOCARBAMOL 500 MG
1500 TABLET ORAL ONCE
Refills: 0 | Status: COMPLETED | OUTPATIENT
Start: 2024-07-07 | End: 2024-07-07

## 2024-07-07 RX ORDER — DEXAMETHASONE 1 MG/1
10 TABLET ORAL ONCE
Refills: 0 | Status: COMPLETED | OUTPATIENT
Start: 2024-07-07 | End: 2024-07-07

## 2024-07-07 RX ORDER — KETOROLAC TROMETHAMINE 30 MG/ML
60 INJECTION, SOLUTION INTRAMUSCULAR ONCE
Refills: 0 | Status: DISCONTINUED | OUTPATIENT
Start: 2024-07-07 | End: 2024-07-07

## 2024-07-07 RX ADMIN — Medication 1500 MILLIGRAM(S): at 04:59

## 2024-07-07 RX ADMIN — KETOROLAC TROMETHAMINE 60 MILLIGRAM(S): 30 INJECTION, SOLUTION INTRAMUSCULAR at 04:59

## 2024-07-07 RX ADMIN — DEXAMETHASONE 10 MILLIGRAM(S): 1 TABLET ORAL at 04:59

## 2024-07-17 NOTE — DISCHARGE NOTE NURSING/CASE MANAGEMENT/SOCIAL WORK - NSDCPETBCESMAN_GEN_ALL_CORE
Pt advised and dismissed in no acute distress. Pt verbalized understanding of d/c instructions and follow up.     small bowel obstruction If you are a smoker, it is important for your health to stop smoking. Please be aware that second hand smoke is also harmful.

## 2024-09-14 ENCOUNTER — NON-APPOINTMENT (OUTPATIENT)
Age: 69
End: 2024-09-14

## 2024-09-19 ENCOUNTER — NON-APPOINTMENT (OUTPATIENT)
Age: 69
End: 2024-09-19

## 2024-09-22 ENCOUNTER — EMERGENCY (EMERGENCY)
Facility: HOSPITAL | Age: 69
LOS: 0 days | Discharge: HOME | End: 2024-09-22
Attending: STUDENT IN AN ORGANIZED HEALTH CARE EDUCATION/TRAINING PROGRAM
Payer: MEDICARE

## 2024-09-22 VITALS
DIASTOLIC BLOOD PRESSURE: 59 MMHG | OXYGEN SATURATION: 98 % | SYSTOLIC BLOOD PRESSURE: 135 MMHG | HEART RATE: 67 BPM | TEMPERATURE: 98 F | RESPIRATION RATE: 20 BRPM

## 2024-09-22 DIAGNOSIS — I10 ESSENTIAL (PRIMARY) HYPERTENSION: ICD-10-CM

## 2024-09-22 DIAGNOSIS — R07.9 CHEST PAIN, UNSPECIFIED: ICD-10-CM

## 2024-09-22 DIAGNOSIS — Z79.02 LONG TERM (CURRENT) USE OF ANTITHROMBOTICS/ANTIPLATELETS: ICD-10-CM

## 2024-09-22 DIAGNOSIS — Z95.1 PRESENCE OF AORTOCORONARY BYPASS GRAFT: ICD-10-CM

## 2024-09-22 DIAGNOSIS — U07.1 COVID-19: ICD-10-CM

## 2024-09-22 DIAGNOSIS — Z88.2 ALLERGY STATUS TO SULFONAMIDES: ICD-10-CM

## 2024-09-22 DIAGNOSIS — Z88.8 ALLERGY STATUS TO OTHER DRUGS, MEDICAMENTS AND BIOLOGICAL SUBSTANCES: ICD-10-CM

## 2024-09-22 DIAGNOSIS — Z90.49 ACQUIRED ABSENCE OF OTHER SPECIFIED PARTS OF DIGESTIVE TRACT: Chronic | ICD-10-CM

## 2024-09-22 DIAGNOSIS — E78.5 HYPERLIPIDEMIA, UNSPECIFIED: ICD-10-CM

## 2024-09-22 DIAGNOSIS — Z95.1 PRESENCE OF AORTOCORONARY BYPASS GRAFT: Chronic | ICD-10-CM

## 2024-09-22 LAB
ALBUMIN SERPL ELPH-MCNC: 4.3 G/DL — SIGNIFICANT CHANGE UP (ref 3.5–5.2)
ALP SERPL-CCNC: 118 U/L — HIGH (ref 30–115)
ALT FLD-CCNC: 9 U/L — SIGNIFICANT CHANGE UP (ref 0–41)
ANION GAP SERPL CALC-SCNC: 12 MMOL/L — SIGNIFICANT CHANGE UP (ref 7–14)
AST SERPL-CCNC: 11 U/L — SIGNIFICANT CHANGE UP (ref 0–41)
BASOPHILS # BLD AUTO: 0.02 K/UL — SIGNIFICANT CHANGE UP (ref 0–0.2)
BASOPHILS NFR BLD AUTO: 0.2 % — SIGNIFICANT CHANGE UP (ref 0–1)
BILIRUB SERPL-MCNC: 0.3 MG/DL — SIGNIFICANT CHANGE UP (ref 0.2–1.2)
BUN SERPL-MCNC: 16 MG/DL — SIGNIFICANT CHANGE UP (ref 10–20)
CALCIUM SERPL-MCNC: 9.1 MG/DL — SIGNIFICANT CHANGE UP (ref 8.4–10.4)
CHLORIDE SERPL-SCNC: 102 MMOL/L — SIGNIFICANT CHANGE UP (ref 98–110)
CO2 SERPL-SCNC: 26 MMOL/L — SIGNIFICANT CHANGE UP (ref 17–32)
CREAT SERPL-MCNC: 1 MG/DL — SIGNIFICANT CHANGE UP (ref 0.7–1.5)
EGFR: 81 ML/MIN/1.73M2 — SIGNIFICANT CHANGE UP
EOSINOPHIL # BLD AUTO: 0.1 K/UL — SIGNIFICANT CHANGE UP (ref 0–0.7)
EOSINOPHIL NFR BLD AUTO: 1.2 % — SIGNIFICANT CHANGE UP (ref 0–8)
GAS PNL BLDV: SIGNIFICANT CHANGE UP
GLUCOSE SERPL-MCNC: 114 MG/DL — HIGH (ref 70–99)
HCT VFR BLD CALC: 34.4 % — LOW (ref 42–52)
HGB BLD-MCNC: 11.1 G/DL — LOW (ref 14–18)
IMM GRANULOCYTES NFR BLD AUTO: 0.7 % — HIGH (ref 0.1–0.3)
LYMPHOCYTES # BLD AUTO: 2.02 K/UL — SIGNIFICANT CHANGE UP (ref 1.2–3.4)
LYMPHOCYTES # BLD AUTO: 23.8 % — SIGNIFICANT CHANGE UP (ref 20.5–51.1)
MCHC RBC-ENTMCNC: 27.7 PG — SIGNIFICANT CHANGE UP (ref 27–31)
MCHC RBC-ENTMCNC: 32.3 G/DL — SIGNIFICANT CHANGE UP (ref 32–37)
MCV RBC AUTO: 85.8 FL — SIGNIFICANT CHANGE UP (ref 80–94)
MONOCYTES # BLD AUTO: 0.98 K/UL — HIGH (ref 0.1–0.6)
MONOCYTES NFR BLD AUTO: 11.6 % — HIGH (ref 1.7–9.3)
NEUTROPHILS # BLD AUTO: 5.3 K/UL — SIGNIFICANT CHANGE UP (ref 1.4–6.5)
NEUTROPHILS NFR BLD AUTO: 62.5 % — SIGNIFICANT CHANGE UP (ref 42.2–75.2)
NRBC # BLD: 0 /100 WBCS — SIGNIFICANT CHANGE UP (ref 0–0)
PLATELET # BLD AUTO: 223 K/UL — SIGNIFICANT CHANGE UP (ref 130–400)
PMV BLD: 9.2 FL — SIGNIFICANT CHANGE UP (ref 7.4–10.4)
POTASSIUM SERPL-MCNC: 4.3 MMOL/L — SIGNIFICANT CHANGE UP (ref 3.5–5)
POTASSIUM SERPL-SCNC: 4.3 MMOL/L — SIGNIFICANT CHANGE UP (ref 3.5–5)
PROT SERPL-MCNC: 7 G/DL — SIGNIFICANT CHANGE UP (ref 6–8)
RBC # BLD: 4.01 M/UL — LOW (ref 4.7–6.1)
RBC # FLD: 14.5 % — SIGNIFICANT CHANGE UP (ref 11.5–14.5)
SODIUM SERPL-SCNC: 140 MMOL/L — SIGNIFICANT CHANGE UP (ref 135–146)
TROPONIN T, HIGH SENSITIVITY RESULT: 14 NG/L — SIGNIFICANT CHANGE UP (ref 6–21)
TROPONIN T, HIGH SENSITIVITY RESULT: 8 NG/L — SIGNIFICANT CHANGE UP (ref 6–21)
WBC # BLD: 8.48 K/UL — SIGNIFICANT CHANGE UP (ref 4.8–10.8)
WBC # FLD AUTO: 8.48 K/UL — SIGNIFICANT CHANGE UP (ref 4.8–10.8)

## 2024-09-22 PROCEDURE — 85025 COMPLETE CBC W/AUTO DIFF WBC: CPT

## 2024-09-22 PROCEDURE — 82803 BLOOD GASES ANY COMBINATION: CPT

## 2024-09-22 PROCEDURE — 94640 AIRWAY INHALATION TREATMENT: CPT

## 2024-09-22 PROCEDURE — 82330 ASSAY OF CALCIUM: CPT

## 2024-09-22 PROCEDURE — 83605 ASSAY OF LACTIC ACID: CPT

## 2024-09-22 PROCEDURE — 93005 ELECTROCARDIOGRAM TRACING: CPT

## 2024-09-22 PROCEDURE — 73030 X-RAY EXAM OF SHOULDER: CPT | Mod: RT

## 2024-09-22 PROCEDURE — 73030 X-RAY EXAM OF SHOULDER: CPT | Mod: 26,RT

## 2024-09-22 PROCEDURE — 71045 X-RAY EXAM CHEST 1 VIEW: CPT

## 2024-09-22 PROCEDURE — 85014 HEMATOCRIT: CPT

## 2024-09-22 PROCEDURE — 80053 COMPREHEN METABOLIC PANEL: CPT

## 2024-09-22 PROCEDURE — 99285 EMERGENCY DEPT VISIT HI MDM: CPT

## 2024-09-22 PROCEDURE — 99285 EMERGENCY DEPT VISIT HI MDM: CPT | Mod: 25

## 2024-09-22 PROCEDURE — 84295 ASSAY OF SERUM SODIUM: CPT

## 2024-09-22 PROCEDURE — 73060 X-RAY EXAM OF HUMERUS: CPT | Mod: 26,RT

## 2024-09-22 PROCEDURE — 96374 THER/PROPH/DIAG INJ IV PUSH: CPT

## 2024-09-22 PROCEDURE — 71045 X-RAY EXAM CHEST 1 VIEW: CPT | Mod: 26

## 2024-09-22 PROCEDURE — 84132 ASSAY OF SERUM POTASSIUM: CPT

## 2024-09-22 PROCEDURE — 36415 COLL VENOUS BLD VENIPUNCTURE: CPT

## 2024-09-22 PROCEDURE — 93010 ELECTROCARDIOGRAM REPORT: CPT

## 2024-09-22 PROCEDURE — 73060 X-RAY EXAM OF HUMERUS: CPT | Mod: RT

## 2024-09-22 PROCEDURE — 84484 ASSAY OF TROPONIN QUANT: CPT

## 2024-09-22 PROCEDURE — 85018 HEMOGLOBIN: CPT

## 2024-09-22 RX ORDER — IPRATROPIUM BROMIDE AND ALBUTEROL SULFATE .5; 3 MG/3ML; MG/3ML
3 SOLUTION RESPIRATORY (INHALATION)
Refills: 0 | Status: COMPLETED | OUTPATIENT
Start: 2024-09-22 | End: 2024-09-22

## 2024-09-22 RX ORDER — DEXAMETHASONE 0.75 MG
6 TABLET ORAL ONCE
Refills: 0 | Status: COMPLETED | OUTPATIENT
Start: 2024-09-22 | End: 2024-09-22

## 2024-09-22 RX ADMIN — IPRATROPIUM BROMIDE AND ALBUTEROL SULFATE 3 MILLILITER(S): .5; 3 SOLUTION RESPIRATORY (INHALATION) at 17:35

## 2024-09-22 RX ADMIN — IPRATROPIUM BROMIDE AND ALBUTEROL SULFATE 3 MILLILITER(S): .5; 3 SOLUTION RESPIRATORY (INHALATION) at 14:28

## 2024-09-22 RX ADMIN — Medication 6 MILLIGRAM(S): at 14:32

## 2024-09-22 RX ADMIN — IPRATROPIUM BROMIDE AND ALBUTEROL SULFATE 3 MILLILITER(S): .5; 3 SOLUTION RESPIRATORY (INHALATION) at 14:25

## 2024-09-22 NOTE — ED ADULT NURSE NOTE - CHIEF COMPLAINT QUOTE
Detail Level: Generalized pt came to ED for being COVID (+) and states he wants to take "the medication for it". pt states he wants to take Paxlovid. states he was sent to the ED by his insurance company. pt states he came to the ED because he "Can't afford this medication for COVID". pt c/o cough, congestion Detail Level: Simple

## 2024-09-22 NOTE — ED PROVIDER NOTE - COVID-19 RESULT
In an effort to ensure that our patients [employer]Well, a Team Member has reviewed your chart and identified an opportunity to provide the best care possible. An attempt was made to discuss or schedule overdue Preventive or Disease Management screening.    The Outcome was Contact was not made, no answer/busy If you have any questions or need help with scheduling, contact your primary care provider.. Care Gaps include Wellness Visits.    This is the 2nd outreach attempt to verify patient's PCP. Patient has not seen Jony Og in 3+ years.   POSITIVE

## 2024-09-22 NOTE — ED PROVIDER NOTE - WR ORDER ID 3
[id number] Consent (Lip)/Introductory Paragraph: The rationale for Mohs was explained to the patient and consent was obtained. The risks, benefits and alternatives to therapy were discussed in detail. Specifically, the risks of lip deformity, changes in the oral aperture, infection, scarring, bleeding, prolonged wound healing, incomplete removal, allergy to anesthesia, nerve injury and recurrence were addressed. Prior to the procedure, the treatment site was clearly identified and confirmed by the patient. All components of Universal Protocol/PAUSE Rule completed.

## 2024-09-22 NOTE — ED PROVIDER NOTE - OBJECTIVE STATEMENT
Patient is a68 year old male with pmhx of CABG on plavix, htn, hld presents for eval after testing covid positive yesterday for possible medication treatment. He endorses left sided chest pain while waiting in the Ed. He denies any other complaints at this time.

## 2024-09-22 NOTE — ED ADULT NURSE NOTE - OBJECTIVE STATEMENT
pt came to ED for being COVID (+) and states he wants to take "the medication for it". pt states he wants to take Paxlovid. states he was sent to the ED by his insurance company. pt states he came to the ED because he "Can't afford this medication for COVID". pt c/o cough, congestion

## 2024-09-22 NOTE — ED ADULT NURSE NOTE - COVID-19 RESULT DATE/TIME
Department of Orthopedic Surgery  Spine Service  Attending Progress Note        Subjective:  Patient doing okay this am, c/o dysphagia and feeling like neck is \"swollen\". Denies arm pain. Indigestion improved. Denies shortness of breath. SpO2 98% RA. Vitals  VITALS:  /60   Pulse 80   Temp 97.8 °F (36.6 °C) (Oral)   Resp 15   Ht 5' 6\" (1.676 m)   Wt 184 lb 4.9 oz (83.6 kg)   SpO2 98%   BMI 29.75 kg/m²   24HR INTAKE/OUTPUT:      Intake/Output Summary (Last 24 hours) at 08/12/18 1029  Last data filed at 08/12/18 0530   Gross per 24 hour   Intake             1420 ml   Output             22.5 ml   Net           1397.5 ml     URINARY CATHETER OUTPUT (Charles):     DRAIN/TUBE OUTPUT:  Closed/Suction Drain Anterior Neck Bulb 7 Romanian-Output (ml): 5 ml      PHYSICAL EXAM:    Orientation:  alert and oriented to person, place and time    Incision:  dressing in place, clean, dry, intact  Dressing removed- neck soft.      Upper Extremity Motor :   Deltoid:  5/5  Biceps:  5/5  Triceps:  5/5  Wrist Flexion:  5/5  Wrist Extension:  5/5  Finger Flexion:  5/5  Finger Extension:  5/5    Upper Motor Neuron Signs:  None  Upper Extremity Sensory:  Intact C3-T1 distribution      ABNORMAL EXAM FINDINGS:  none    LABS:    HgB:    Lab Results   Component Value Date    HGB 15.7 07/19/2018       ASSESSMENT AND PLAN:    Post operative day 2 status post C5-6 total disc replacement    1:  Monitor labs and drain output  2:  Activity Level:  As tolerated  3:  Pain Control:  Good  4:  Discharge Planning:  Pending, home this afternoon if CT okay  5:  CT soft tissue neck ordered to r/o hematoma  6:  Decadron 8 mg IV q8h ordered    [employer] 21-Sep-2024 15:32

## 2024-09-22 NOTE — ED PROVIDER NOTE - CLINICAL SUMMARY MEDICAL DECISION MAKING FREE TEXT BOX
68 year old male with pmhx of CABG on plavix, htn, hld presents for eval after testing covid positive yesterday for possible medication treatment. he is concerned w/ +COVID and is requesting mab infusion. PT sxs started 5 ago, tested + 4d ago.     AOx4, Non toxic appearing, NAD, speaking in full sentences. Skin  warm and dry, no acute rash. Head normocephalic, atraumatic. PERRLA/EOMI, conjunctiva and sclera clear. MM moist, no nasal discharge.  Pharynx and TM's unremarkable.  No mastoid or temporal ttp. Neck supple nt, no meningeal signs. Heart RRR s1s2 nl, no rub/murmur. Lungs- No retractions, BS equal, CTAB. Abdomen soft ntnd no r/g. Extremities- moves all, +equal distal pulses, brisk cap refill, sensation wnl, normal ROM. No LE edema, calves nttp b/l.    plan: labs, imaging, meds, reassess    dispo: home w/ f/u    Labs and EKG were ordered and reviewed.  Imaging was ordered and reviewed by me.  Appropriate medications for patient's presenting complaints were ordered and effects were reassessed.  Patient's records (prior hospital, ED visit, and/or nursing home notes if available) were reviewed.  Additional history was obtained from EMS, family, and/or PCP (where available).  Escalation to admission/observation was considered.  However patient feels much better and is comfortable with discharge.  Appropriate follow-up was arranged.

## 2024-09-22 NOTE — ED PROVIDER NOTE - PATIENT PORTAL LINK FT
You can access the FollowMyHealth Patient Portal offered by [facility] by registering at the following website: [email or web address]. By joining [employer]’s FollowMyHealth portal, you will also be able to view your health information using other applications (apps) compatible with our system.

## 2024-09-22 NOTE — ED PROVIDER NOTE - NSFOLLOWUPINSTRUCTIONS_ED_ALL_ED_FT
DISCHARGE INSTRUCTIONS  - Please follow up with your doctor in 1-3 days  - Return to ED if you notice worsening vomiting, develop diarrhea, develop fevers, signs of respiratory distress, decreased oral intake, decreased wet diapers or any other concerning symptoms    COVID-19: Slow the Coronavirus Spread    WHAT YOU NEED TO KNOW:    Why is it important to slow the spread of the 2019 coronavirus? The virus that causes coronavirus disease 2019 (COVID-19) is highly contagious (easily spread). COVID-19 causes severe or life-threatening breathing problems in some people. The greatest risk is to those 65 or older or who have a chronic health condition, but anyone can develop serious problems. The spread of this virus must be slowed to prevent as many people as possible from being infected. The healthcare system will be overloaded if too many people are infected at the same time. Signs and symptoms of infection can take up to 14 days to begin. This means you or someone around you may have the virus and pass it to others without knowing it. You can help slow the spread of the virus by following worldwide and local guidelines for infection prevention.    How does the 2019 coronavirus spread? The virus spreads quickly and easily. The following are ways the virus is thought to spread, but more information may be coming:     Droplets are the most common way all coronaviruses spread. The virus can travel in droplets that form when a person talks, coughs, or sneezes. Anyone who breathes in the droplets or gets them in his or her eyes can become infected with the virus.    Person-to-person contact can spread the virus. For example, an infected person can spread the virus by shaking hands with someone. At this time, it does not appear that the virus can be passed to a baby during pregnancy or delivery. The virus also does not appear to spread during breastfeeding. If you are pregnant or breastfeeding, talk to your healthcare provider or obstetrician about any concerns you have.    The virus can live on objects and surfaces. A person can get the virus on his or her hands by touching the object or surface. Infection happens if the person then touches his or her eyes or mouth with unwashed hands. It is not yet known how long the virus can stay on an object or surface. That is why it is important to clean all surfaces that are used regularly.    An infected animal may be able to infect a person who touches it. This may happen at live markets or on a farm.    How will washing my hands help prevent the virus from spreading? When you use soap and water, you kill and remove the virus from your hands. This prevents you from being infected or infecting others. Wash your hands often throughout the day. Always wash after you use the bathroom, change a child's diaper, and before you prepare or eat food. Teach children how to wash their hands.    The best way to clean your hands is with soap and running water. Do not touch the faucet directly when you turn the water on and off. You can use your forearm or hold a towel or paper towel to turn the faucet.    Rub your soapy hands together, lacing your fingers. Wash the front and back of each hand, and in between your fingers. Use the fingers of one hand to scrub under the fingernails of the other hand. Wash for at least 20 seconds.    Rinse with warm, running water for several seconds. Then dry your hands with a clean towel or paper towel.    You can use hand  that contains alcohol if soap and water are not available. Do not touch your eyes, nose, or mouth without washing your hands first.    How will covering sneezes and coughs help prevent the virus from spreading? You prevent the droplets from traveling from you to others by covering sneezes and coughs. Use a tissue that covers your mouth and nose. Throw the tissue away in a trash can right away. Use the bend of your arm if a tissue is not available. Then wash your hands well with soap and water or use a hand . Teach children how to cover a cough or sneeze. Remind children to wash their hands after sneezing or coughing.    How will social distancing help prevent the virus from spreading? Social distancing means people stay far enough apart so the virus will not spread from one person to another. This keeps large numbers of people from becoming infected at the same time. An infected person can spread the virus before signs or symptoms begin and for a time after recovery. Worldwide guidelines have been created to help everyone find ways to stay apart physically. You may also have guidelines for the country you live in, or for your local area. The following are common social distancing guidelines:     Stay at least 6 feet (2 meters) away from anyone who does not live in your home. Do not shake hands with, hug, or kiss a person as a greeting. It may be hard to be away from people you usually spend time with. Phone calls, e-mail messages, social media websites, and video chats are all safe ways to stay connected. Check in on others who may be having a hard time socially distancing, or who live alone. Ask administrators at nursing homes or long-term care facilities how you can safely communicate with someone living there.    Avoid crowds as much as possible. Worldwide guidelines do not allow gatherings of 10 or more people. Your country or local guidelines may lower the number to 4 or fewer. If you must use public transportation (such as a city bus or subway), try to sit or stand away from others.    Do not go to another person's home or have anyone to yours. It is especially important that you do not visit anyone who has COVID-19. Do not visit anyone who might be infected and is in isolation until test results are known to be negative. It might be necessary for healthcare providers or caregivers to come in and provide care. Remind anyone who comes in to wash his or her hands.    Do not go out of your home unless it is necessary. Most local guidelines allow leaving the home to buy food or medical supplies, or to seek medical care. If it is available in your area, you may be able to have food, medicines, and other supplies delivered. If possible, have items delivered to your home placed somewhere safe. Try not to have someone hand you an item. You will be so close to the person that the virus can spread between you. You can wipe items with a disinfecting cloth before you bring them into your home. This keeps the virus from getting on your hands or being transferred to a surface in your home.    Ask your healthcare provider for alternate ways to have an appointment. You may also be able to have appointments without your having to go into the office. Some providers offer phone, video, or other types of appointments. You may also be able to get prescriptions for a few months of your medicines at a time.    Stay safe if you must go out to work. You may have a job only done outside your home that is considered essential. Examples include healthcare workers, firefighters and police officers, and utility workers. Keep physical distance between you and other workers as much as possible. Follow your employer's rules so everyone stays safe.    Be aware of what you touch. The virus can live on objects and surfaces for hours to days. If you get the virus on your hands, you can transfer it to your eyes, nose, or mouth and become infected. You can also transfer it to other objects, surfaces, or people. Social distancing guidelines include being careful about what you touch. Wash your hands often to lower the risk of being infected or infecting others.    What can I do to prevent an infection in my home?     Wash your hands often. Make it a habit to wash your hands throughout the day. Wash before and after you care for anyone who thinks or knows he or she has COVID-19. Always use soap and water. Remember to wash for at least 20 seconds.    Clean and disinfect high-touch surfaces and objects often. Surfaces include countertops, cupboard doors, desks, handles, handrails, doorknobs, toilet handles, faucets, chairs, and light switches. Objects include keys, phones, computer keyboards and mice, video games, remote controls, and children's toys. Some surfaces and objects may need to be cleaned several times each day, depending on how often they are used. Clean and disinfect regularly even if you think no one living in or visiting your home is infected with the virus. Remember that a person can pass the virus to others even if he or she does not have signs or symptoms of COVID-19.    Clean with wipes or a solution made to disinfect. You can make a solution by mixing 1 part bleach with 10 parts water. Use a sponge or cloth that can be thrown away or washed in hot, soapy water and reused. Clean used dishes and utensils in hot, soapy water or in the .    Do not share items with anyone. This includes food, drinks, dishes, and eating utensils.    Prepare surfaces any time you are going to bring something into your home. Find space you can use to place items you bring in. Find space you can clean and disinfect well, such as a kitchen countertop.    Safely handle packages delivered to your home. It is not known for sure how long the virus can live on cardboard or other packaging. Wipe the package off with a sanitizing wipe, if possible. Open the package. Wash your hands. Then move the contents of the package onto a clean surface. Throw the packaging away outside your home. Then wash your hands for at least 20 seconds with soap and water. Clean the surface you laid the package on when you brought it in. Remember to clean and disinfect anything else you touched, such as doorknobs or faucet handles.    Safely handle food you have delivered or  from a restaurant. Try to order hot food as much as possible. If possible, have food left at your door or placed into your car. These steps will help prevent close physical contact with anyone. Open the delivery containers and put the contents into clean dishes or containers. Throw the delivery containers away outside your home. Wash your hands. You can heat your food in the microwave or oven, or on the stove, if needed.    Keep anyone who is infected away from others in the home. A person who has the virus needs to stay at home until healthcare providers say it is okay. This is important, but it can also lead to others in the home becoming infected. The infected person must be isolated (kept separate). The person should have his or her own dishes and eating utensils. Items the person uses need to be cleaned separately from items used by others. His or her clothing and bedding need to be washed separately from those of others in the home. Anyone who touches items, clothing, or bedding the person uses needs to wear gloves that can be thrown away after use. The person can safely stay connected to friends and family members in ways such as the phone, a video chat, or e-mail.    How can I stay safe if I must go out of my home? It is best not to go out unless necessary. If you must go out, follow these and any local recommendations to prevent infection:     Before you go out:     Remember to wash your hands. Wash before you leave the house so you do not bring the virus with you. Wash again when you get home, after you put away any items you bought.    Bring disinfecting wipes or hand  with you. Hold a wipe or tissue as you open any doors. Use hand  after you touch elevator buttons or other commonly used surfaces or items. Apply hand  or use a wipe for your hands before you use the keys to unlock or start the car.    Make a list of items to buy before you go out. This will limit the items you touch in the store. The virus may live on surfaces and objects for up to several days. The more items you handle, the more risk you take of getting the virus on your hands.    Prepare surfaces for when you return. Find space you can use to place items you bought. Find space you can clean and disinfect well, such as a kitchen countertop.    While you are out:    Use disinfecting wipes to clean items you need to use to shop. Clean shopping cart handles, and the area a toddler will sit or you will put a baby carrier. Wipe a cart made for children to drive in the store before your toddler gets into it. Wipe the seat, steering wheel, and any part your child must touch.    If possible, use a debit or credit card to pay. The virus may be able to stay on paper money. You can become infected when you touch the money.    You do not need to wear a medical mask if you are healthy. Medical professionals need the masks so they can safely test or care for those who are infected. If you want to cover your nose and mouth, you can use a thick, tightly woven cloth, such as a bandana. You can tie the cloth behind your head to keep it secured to your face.    When you return:     Unpack your items safely. Wipe or wash your hands before you open packaging. Put your items on the clean surface you prepared. Use a disinfecting wipe to clean all sides and handling areas of items before you put them away. Wash fruits and vegetables before you put them in containers or in the refrigerator or freezer. If possible, scrub the skins with a vegetable brush.    Clean countertops or other surfaces any shopping bags touched. When you are finished putting your items away, wash your hands. Then use disinfecting wipes or a solution to clean the surfaces. You can also wipe surfaces in the vehicle you drove. You can wipe the area the bags had been. You can also wipe anything you touched inside the car. Examples include the steering wheel, seatbelt, inner and outer door handles, turn signals, and radio or other buttons. After items are put away and areas cleaned, remember to wash your hands.    When should I call my doctor?     - You have questions about social distancing or ways to keep your home and family safe.  - You have questions or concerns about the new coronavirus or COVID-19.    Where can I find more information?     [facility] [employer] [facility]  [address] [place], [place]  Phone: 0-535-1360008  Phone: [phone]  Web Address: http://www.cdc.gov    CARE AGREEMENT:    You have the right to help plan your care. Learn about your health condition and how it may be treated. Discuss treatment options with your healthcare providers to decide what care you want to receive. You always have the right to refuse treatment.      © Copyright "[employer] 2020

## 2024-12-11 ENCOUNTER — OUTPATIENT (OUTPATIENT)
Dept: OUTPATIENT SERVICES | Facility: HOSPITAL | Age: 69
LOS: 1 days | End: 2024-12-11
Payer: MEDICARE

## 2024-12-11 ENCOUNTER — NON-APPOINTMENT (OUTPATIENT)
Age: 69
End: 2024-12-11

## 2024-12-11 DIAGNOSIS — R05.1 ACUTE COUGH: ICD-10-CM

## 2024-12-11 DIAGNOSIS — Z90.49 ACQUIRED ABSENCE OF OTHER SPECIFIED PARTS OF DIGESTIVE TRACT: Chronic | ICD-10-CM

## 2024-12-11 DIAGNOSIS — Z95.1 PRESENCE OF AORTOCORONARY BYPASS GRAFT: Chronic | ICD-10-CM

## 2024-12-11 PROCEDURE — 71046 X-RAY EXAM CHEST 2 VIEWS: CPT

## 2024-12-11 PROCEDURE — 71046 X-RAY EXAM CHEST 2 VIEWS: CPT | Mod: 26

## 2024-12-12 DIAGNOSIS — R05.1 ACUTE COUGH: ICD-10-CM

## 2024-12-28 ENCOUNTER — NON-APPOINTMENT (OUTPATIENT)
Age: 69
End: 2024-12-28

## 2025-02-11 ENCOUNTER — OUTPATIENT (OUTPATIENT)
Dept: OUTPATIENT SERVICES | Facility: HOSPITAL | Age: 70
LOS: 1 days | End: 2025-02-11
Payer: MEDICARE

## 2025-02-11 DIAGNOSIS — M54.2 CERVICALGIA: ICD-10-CM

## 2025-02-11 DIAGNOSIS — Z90.49 ACQUIRED ABSENCE OF OTHER SPECIFIED PARTS OF DIGESTIVE TRACT: Chronic | ICD-10-CM

## 2025-02-11 DIAGNOSIS — Z00.8 ENCOUNTER FOR OTHER GENERAL EXAMINATION: ICD-10-CM

## 2025-02-11 DIAGNOSIS — Z95.1 PRESENCE OF AORTOCORONARY BYPASS GRAFT: Chronic | ICD-10-CM

## 2025-02-11 DIAGNOSIS — M54.6 PAIN IN THORACIC SPINE: ICD-10-CM

## 2025-02-11 PROCEDURE — 72141 MRI NECK SPINE W/O DYE: CPT

## 2025-02-11 PROCEDURE — 72141 MRI NECK SPINE W/O DYE: CPT | Mod: 26

## 2025-02-11 PROCEDURE — 72146 MRI CHEST SPINE W/O DYE: CPT | Mod: 26

## 2025-02-11 PROCEDURE — 72146 MRI CHEST SPINE W/O DYE: CPT

## 2025-02-12 DIAGNOSIS — M54.2 CERVICALGIA: ICD-10-CM

## 2025-02-12 DIAGNOSIS — M54.6 PAIN IN THORACIC SPINE: ICD-10-CM

## 2025-06-10 ENCOUNTER — APPOINTMENT (OUTPATIENT)
Dept: OTOLARYNGOLOGY | Facility: CLINIC | Age: 70
End: 2025-06-10

## 2025-07-22 ENCOUNTER — APPOINTMENT (OUTPATIENT)
Dept: OTOLARYNGOLOGY | Facility: CLINIC | Age: 70
End: 2025-07-22

## 2025-08-06 ENCOUNTER — APPOINTMENT (OUTPATIENT)
Dept: PAIN MANAGEMENT | Facility: CLINIC | Age: 70
End: 2025-08-06

## 2025-08-12 ENCOUNTER — APPOINTMENT (OUTPATIENT)
Dept: OTOLARYNGOLOGY | Facility: CLINIC | Age: 70
End: 2025-08-12
Payer: MEDICARE

## 2025-08-12 VITALS — BODY MASS INDEX: 32.58 KG/M2 | HEIGHT: 68 IN | WEIGHT: 215 LBS

## 2025-08-12 DIAGNOSIS — H90.3 SENSORINEURAL HEARING LOSS, BILATERAL: ICD-10-CM

## 2025-08-12 DIAGNOSIS — H61.23 IMPACTED CERUMEN, BILATERAL: ICD-10-CM

## 2025-08-12 PROCEDURE — 92557 COMPREHENSIVE HEARING TEST: CPT

## 2025-08-12 PROCEDURE — 92550 TYMPANOMETRY & REFLEX THRESH: CPT | Mod: 52

## 2025-08-12 PROCEDURE — G0268 REMOVAL OF IMPACTED WAX MD: CPT

## 2025-08-12 PROCEDURE — 99203 OFFICE O/P NEW LOW 30 MIN: CPT | Mod: 25
